# Patient Record
Sex: MALE | Race: WHITE | NOT HISPANIC OR LATINO | Employment: FULL TIME | URBAN - NONMETROPOLITAN AREA
[De-identification: names, ages, dates, MRNs, and addresses within clinical notes are randomized per-mention and may not be internally consistent; named-entity substitution may affect disease eponyms.]

---

## 2019-11-18 ENCOUNTER — APPOINTMENT (OUTPATIENT)
Dept: LAB | Facility: HOSPITAL | Age: 44
End: 2019-11-18

## 2019-11-18 ENCOUNTER — OFFICE VISIT (OUTPATIENT)
Dept: FAMILY MEDICINE CLINIC | Facility: CLINIC | Age: 44
End: 2019-11-18

## 2019-11-18 VITALS
TEMPERATURE: 98.3 F | BODY MASS INDEX: 33.5 KG/M2 | HEIGHT: 70 IN | WEIGHT: 234 LBS | DIASTOLIC BLOOD PRESSURE: 110 MMHG | SYSTOLIC BLOOD PRESSURE: 160 MMHG

## 2019-11-18 DIAGNOSIS — I10 ESSENTIAL HYPERTENSION: Primary | ICD-10-CM

## 2019-11-18 DIAGNOSIS — Z00.00 GENERAL MEDICAL EXAM: ICD-10-CM

## 2019-11-18 DIAGNOSIS — G25.81 RESTLESS LEG SYNDROME: ICD-10-CM

## 2019-11-18 LAB
25(OH)D3 SERPL-MCNC: 29.5 NG/ML (ref 30–100)
ALBUMIN SERPL-MCNC: 4.8 G/DL (ref 3.5–5.2)
ALBUMIN/GLOB SERPL: 1.4 G/DL
ALP SERPL-CCNC: 92 U/L (ref 39–117)
ALT SERPL W P-5'-P-CCNC: 31 U/L (ref 1–41)
ANION GAP SERPL CALCULATED.3IONS-SCNC: 13.3 MMOL/L (ref 5–15)
AST SERPL-CCNC: 38 U/L (ref 1–40)
BASOPHILS # BLD AUTO: 0.06 10*3/MM3 (ref 0–0.2)
BASOPHILS NFR BLD AUTO: 0.6 % (ref 0–1.5)
BILIRUB SERPL-MCNC: 0.6 MG/DL (ref 0.2–1.2)
BUN BLD-MCNC: 11 MG/DL (ref 6–20)
BUN/CREAT SERPL: 15.9 (ref 7–25)
CALCIUM SPEC-SCNC: 9.6 MG/DL (ref 8.6–10.5)
CHLORIDE SERPL-SCNC: 95 MMOL/L (ref 98–107)
CO2 SERPL-SCNC: 27.7 MMOL/L (ref 22–29)
CREAT BLD-MCNC: 0.69 MG/DL (ref 0.76–1.27)
DEPRECATED RDW RBC AUTO: 40.9 FL (ref 37–54)
EOSINOPHIL # BLD AUTO: 0.08 10*3/MM3 (ref 0–0.4)
EOSINOPHIL NFR BLD AUTO: 0.8 % (ref 0.3–6.2)
ERYTHROCYTE [DISTWIDTH] IN BLOOD BY AUTOMATED COUNT: 11.8 % (ref 12.3–15.4)
GFR SERPL CREATININE-BSD FRML MDRD: 124 ML/MIN/1.73
GLOBULIN UR ELPH-MCNC: 3.4 GM/DL
GLUCOSE BLD-MCNC: 223 MG/DL (ref 65–99)
HCT VFR BLD AUTO: 47.4 % (ref 37.5–51)
HGB BLD-MCNC: 16.1 G/DL (ref 13–17.7)
IMM GRANULOCYTES # BLD AUTO: 0.02 10*3/MM3 (ref 0–0.05)
IMM GRANULOCYTES NFR BLD AUTO: 0.2 % (ref 0–0.5)
IRON 24H UR-MRATE: 114 MCG/DL (ref 59–158)
LYMPHOCYTES # BLD AUTO: 3.29 10*3/MM3 (ref 0.7–3.1)
LYMPHOCYTES NFR BLD AUTO: 32.4 % (ref 19.6–45.3)
MAGNESIUM SERPL-MCNC: 2.1 MG/DL (ref 1.6–2.6)
MCH RBC QN AUTO: 31.8 PG (ref 26.6–33)
MCHC RBC AUTO-ENTMCNC: 34 G/DL (ref 31.5–35.7)
MCV RBC AUTO: 93.7 FL (ref 79–97)
MONOCYTES # BLD AUTO: 0.77 10*3/MM3 (ref 0.1–0.9)
MONOCYTES NFR BLD AUTO: 7.6 % (ref 5–12)
NEUTROPHILS # BLD AUTO: 5.93 10*3/MM3 (ref 1.7–7)
NEUTROPHILS NFR BLD AUTO: 58.4 % (ref 42.7–76)
NRBC BLD AUTO-RTO: 0 /100 WBC (ref 0–0.2)
PLATELET # BLD AUTO: 259 10*3/MM3 (ref 140–450)
PMV BLD AUTO: 10.1 FL (ref 6–12)
POTASSIUM BLD-SCNC: 4.5 MMOL/L (ref 3.5–5.2)
PROT SERPL-MCNC: 8.2 G/DL (ref 6–8.5)
RBC # BLD AUTO: 5.06 10*6/MM3 (ref 4.14–5.8)
SODIUM BLD-SCNC: 136 MMOL/L (ref 136–145)
TESTOST SERPL-MCNC: 297 NG/DL (ref 249–836)
TSH SERPL DL<=0.05 MIU/L-ACNC: 2.97 UIU/ML (ref 0.27–4.2)
VIT B12 BLD-MCNC: 562 PG/ML (ref 211–946)
WBC NRBC COR # BLD: 10.15 10*3/MM3 (ref 3.4–10.8)

## 2019-11-18 PROCEDURE — 84403 ASSAY OF TOTAL TESTOSTERONE: CPT | Performed by: NURSE PRACTITIONER

## 2019-11-18 PROCEDURE — 83540 ASSAY OF IRON: CPT | Performed by: NURSE PRACTITIONER

## 2019-11-18 PROCEDURE — 80053 COMPREHEN METABOLIC PANEL: CPT | Performed by: NURSE PRACTITIONER

## 2019-11-18 PROCEDURE — 99204 OFFICE O/P NEW MOD 45 MIN: CPT | Performed by: NURSE PRACTITIONER

## 2019-11-18 PROCEDURE — 84443 ASSAY THYROID STIM HORMONE: CPT | Performed by: NURSE PRACTITIONER

## 2019-11-18 PROCEDURE — 82607 VITAMIN B-12: CPT | Performed by: NURSE PRACTITIONER

## 2019-11-18 PROCEDURE — 82306 VITAMIN D 25 HYDROXY: CPT | Performed by: NURSE PRACTITIONER

## 2019-11-18 PROCEDURE — 83735 ASSAY OF MAGNESIUM: CPT | Performed by: NURSE PRACTITIONER

## 2019-11-18 PROCEDURE — 36415 COLL VENOUS BLD VENIPUNCTURE: CPT | Performed by: NURSE PRACTITIONER

## 2019-11-18 PROCEDURE — 85025 COMPLETE CBC W/AUTO DIFF WBC: CPT | Performed by: NURSE PRACTITIONER

## 2019-11-18 PROCEDURE — 83036 HEMOGLOBIN GLYCOSYLATED A1C: CPT | Performed by: NURSE PRACTITIONER

## 2019-11-18 RX ORDER — ALPRAZOLAM 0.25 MG/1
0.25 TABLET ORAL AS NEEDED
Refills: 0 | COMMUNITY
Start: 2019-08-13 | End: 2019-12-12

## 2019-11-18 RX ORDER — GLIPIZIDE 10 MG/1
10 TABLET ORAL DAILY
COMMUNITY
Start: 2018-08-17 | End: 2020-04-29 | Stop reason: SDUPTHER

## 2019-11-18 RX ORDER — PANTOPRAZOLE SODIUM 20 MG/1
20 TABLET, DELAYED RELEASE ORAL AS NEEDED
COMMUNITY
Start: 2018-02-20 | End: 2020-04-29 | Stop reason: SDUPTHER

## 2019-11-18 RX ORDER — CLONIDINE HYDROCHLORIDE 0.1 MG/1
TABLET ORAL
Qty: 60 TABLET | Refills: 5 | Status: SHIPPED | OUTPATIENT
Start: 2019-11-18 | End: 2020-04-29 | Stop reason: SDUPTHER

## 2019-11-18 RX ORDER — CYCLOBENZAPRINE HCL 10 MG
10 TABLET ORAL 3 TIMES DAILY PRN
Refills: 0 | COMMUNITY
Start: 2019-09-10 | End: 2019-12-12

## 2019-11-18 RX ORDER — PROMETHAZINE HYDROCHLORIDE 25 MG/1
25 TABLET ORAL AS NEEDED
COMMUNITY
Start: 2018-07-26 | End: 2020-12-14 | Stop reason: SDUPTHER

## 2019-11-18 RX ORDER — GABAPENTIN 300 MG/1
300 CAPSULE ORAL 2 TIMES DAILY
Qty: 60 CAPSULE | Refills: 2 | Status: SHIPPED | OUTPATIENT
Start: 2019-11-18 | End: 2020-01-29

## 2019-11-18 RX ORDER — LISINOPRIL 40 MG/1
40 TABLET ORAL DAILY
COMMUNITY
Start: 2019-08-07 | End: 2019-11-18

## 2019-11-18 RX ORDER — FLUTICASONE PROPIONATE 50 MCG
2 SPRAY, SUSPENSION (ML) NASAL DAILY
COMMUNITY
Start: 2019-11-12 | End: 2020-11-12

## 2019-11-18 RX ORDER — METOPROLOL SUCCINATE 25 MG/1
25 TABLET, EXTENDED RELEASE ORAL DAILY
COMMUNITY
Start: 2019-07-16 | End: 2020-04-29 | Stop reason: SDUPTHER

## 2019-11-18 RX ORDER — TIZANIDINE 4 MG/1
4 TABLET ORAL NIGHTLY
Refills: 2 | COMMUNITY
Start: 2019-08-13 | End: 2020-01-24 | Stop reason: SDUPTHER

## 2019-11-18 RX ORDER — OLMESARTAN MEDOXOMIL 40 MG/1
40 TABLET ORAL DAILY
Qty: 30 TABLET | Refills: 11 | Status: SHIPPED | OUTPATIENT
Start: 2019-11-18 | End: 2019-12-12 | Stop reason: ALTCHOICE

## 2019-11-18 NOTE — PROGRESS NOTES
Chief Complaint   Patient presents with   • testosterone levels     est care     Subjective   Noman Mims is a 44 y.o. male.     Hypertension   This is a chronic problem. The current episode started more than 1 year ago. The problem has been resolved since onset. The problem is controlled. Pertinent negatives include no anxiety, blurred vision, chest pain, headaches, malaise/fatigue, neck pain, orthopnea, palpitations, peripheral edema, PND, shortness of breath or sweats. Risk factors for coronary artery disease include male gender and sedentary lifestyle. Past treatments include ACE inhibitors. Current antihypertension treatment includes ACE inhibitors. The current treatment provides significant improvement. Compliance problems include diet.  There is no history of angina, kidney disease, CAD/MI, CVA, heart failure, left ventricular hypertrophy, PVD or retinopathy. There is no history of chronic renal disease, coarctation of the aorta, hyperaldosteronism, hypercortisolism, hyperparathyroidism, a hypertension causing med, pheochromocytoma, renovascular disease, sleep apnea or a thyroid problem.   Lower Extremity Issue   This is a recurrent problem. The current episode started more than 1 month ago. The problem occurs constantly. The problem has been gradually worsening. Associated symptoms include arthralgias, fatigue, joint swelling and myalgias. Pertinent negatives include no abdominal pain, anorexia, change in bowel habit, chest pain, chills, congestion, coughing, diaphoresis, fever, headaches, nausea, neck pain, numbness, rash, sore throat, swollen glands, urinary symptoms, vertigo, visual change, vomiting or weakness. The symptoms are aggravated by walking. Treatments tried: neurontin in the past  The treatment provided mild relief.   Fatigue   This is a chronic problem. The current episode started more than 1 year ago. The problem has been gradually worsening. Associated symptoms include arthralgias,  "fatigue, joint swelling and myalgias. Pertinent negatives include no abdominal pain, anorexia, change in bowel habit, chest pain, chills, congestion, coughing, diaphoresis, fever, headaches, nausea, neck pain, numbness, rash, sore throat, swollen glands, urinary symptoms, vertigo, visual change, vomiting or weakness. He has tried rest for the symptoms. The treatment provided mild relief.        The following portions of the patient's history were reviewed and updated as appropriate: allergies, current medications, past social history and problem list.    Review of Systems   Constitutional: Positive for activity change and fatigue. Negative for chills, diaphoresis, fever and malaise/fatigue.   HENT: Negative.  Negative for congestion and sore throat.    Eyes: Negative.  Negative for blurred vision, photophobia, redness and visual disturbance.   Respiratory: Negative.  Negative for cough, choking, chest tightness, shortness of breath, wheezing and stridor.    Cardiovascular: Negative for chest pain, palpitations, orthopnea, leg swelling and PND.   Gastrointestinal: Negative.  Negative for abdominal pain, anorexia, change in bowel habit, nausea and vomiting.   Endocrine: Negative.  Negative for heat intolerance, polydipsia, polyphagia and polyuria.   Genitourinary: Negative.  Negative for decreased urine volume and scrotal swelling.   Musculoskeletal: Positive for arthralgias, back pain, gait problem, joint swelling and myalgias. Negative for neck pain and neck stiffness.        Lower extremity concerns -rsl worsening    Skin: Negative.  Negative for rash.   Allergic/Immunologic: Negative.    Neurological: Negative for vertigo, weakness, numbness and headaches.   Hematological: Negative.  Negative for adenopathy. Does not bruise/bleed easily.   Psychiatric/Behavioral: Negative.  Negative for agitation and behavioral problems.       Objective   BP (!) 160/110   Temp 98.3 °F (36.8 °C)   Ht 177.8 cm (70\")   Wt 106 kg " (234 lb)   BMI 33.58 kg/m²   Physical Exam   Constitutional: He is oriented to person, place, and time. He appears well-developed and well-nourished. No distress.   HENT:   Head: Normocephalic and atraumatic.   Right Ear: External ear normal.   Left Ear: External ear normal.   Mouth/Throat: Oropharynx is clear and moist. No oropharyngeal exudate.   Eyes: EOM are normal. Pupils are equal, round, and reactive to light. Right eye exhibits no discharge. Left eye exhibits no discharge. No scleral icterus.   Neck: Normal range of motion. Neck supple. No JVD present. No tracheal deviation present. No thyromegaly present.   Cardiovascular: Normal rate, regular rhythm, normal heart sounds and intact distal pulses. Exam reveals no gallop and no friction rub.   No murmur heard.  Pulmonary/Chest: Effort normal and breath sounds normal. No stridor. No respiratory distress. He has no wheezes. He has no rales. He exhibits no tenderness.   Abdominal: Soft. Bowel sounds are normal. He exhibits no distension and no mass. There is no tenderness. There is no rebound and no guarding. No hernia.   Musculoskeletal: Normal range of motion. He exhibits tenderness. He exhibits no edema or deformity.   Lower extremity pain with evaluation    Lymphadenopathy:     He has no cervical adenopathy.   Neurological: He is alert and oriented to person, place, and time. He displays normal reflexes. No cranial nerve deficit or sensory deficit. He exhibits normal muscle tone. Coordination normal.   Skin: Skin is warm. No rash noted. He is not diaphoretic. No erythema. No pallor.   Nursing note and vitals reviewed.      Assessment/Plan   Problem List Items Addressed This Visit        Cardiovascular and Mediastinum    Essential hypertension - Primary    Relevant Medications    metoprolol succinate XL (TOPROL-XL) 25 MG 24 hr tablet    olmesartan (BENICAR) 40 MG tablet    cloNIDine (CATAPRES) 0.1 MG tablet    Other Relevant Orders    CBC & Differential     Comprehensive Metabolic Panel    Hemoglobin A1c    Iron    Lipid Panel    Vitamin D 25 Hydroxy    Vitamin B12    TSH    Magnesium    Testosterone    CBC Auto Differential       Other    General medical exam    Relevant Orders    CBC & Differential    Comprehensive Metabolic Panel    Hemoglobin A1c    Iron    Lipid Panel    Vitamin D 25 Hydroxy    Vitamin B12    TSH    Magnesium    Testosterone    CBC Auto Differential    Restless leg syndrome            New Medications Ordered This Visit   Medications   • olmesartan (BENICAR) 40 MG tablet     Sig: Take 1 tablet by mouth Daily.     Dispense:  30 tablet     Refill:  11   • cloNIDine (CATAPRES) 0.1 MG tablet     Sig: Use if blood pressure is 180/90 or above bid     Dispense:  60 tablet     Refill:  5   • gabapentin (NEURONTIN) 300 MG capsule     Sig: Take 1 capsule by mouth 2 (Two) Times a Day.     Dispense:  60 capsule     Refill:  2       It's not just what you eat, but when you eat  Eat breakfast, and eat smaller meals throughout the day. A healthy breakfast can jumpstart your metabolism, while eating small, healthy meals (rather than the standard three large meals) keeps your energy up.   Avoid eating at night. Try to eat dinner earlier and fast for 14-16 hours until breakfast the next morning. Studies suggest that eating only when you’re most active and giving your digestive system a long break each day may help to regulate weight.     meds as directed  Labs ordered, diet discussed, recheck as directed    Change from lisinopril to benicar-monitor blood pressure closely diet discussed, cut out beer and increase water, add neurontin as directed   For rls -recheck in 3 weeks for blood pressure     Patient understands the risks associated with this controlled medication, including tolerance and addiction.  he also agrees to only obtain this medication from me, and not from a another provider, unless that provider is covering for me in my absence.  he also agrees to be  compliant in dosing, and not self adjust the dose of medication.  A signed controlled substance agreement is on file, and he has received a controlled substance education sheet at this a previous visit.  he has also signed a consent for treatment with a controlled substance as per [facility] policy. SYLVIA was obtained.

## 2019-11-19 ENCOUNTER — TELEPHONE (OUTPATIENT)
Dept: FAMILY MEDICINE CLINIC | Facility: CLINIC | Age: 44
End: 2019-11-19

## 2019-11-19 DIAGNOSIS — R73.9 ELEVATED BLOOD SUGAR: Primary | ICD-10-CM

## 2019-11-19 LAB — HBA1C MFR BLD: 8.9 % (ref 4.8–5.6)

## 2019-11-19 NOTE — TELEPHONE ENCOUNTER
----- Message from LISBETH De La Rosa sent at 11/19/2019  2:42 PM CST -----  Regarding: FW:  Can you have them add an A1c to his labs from yesterday.  ----- Message -----  From: Lab, Background User  Sent: 11/18/2019   7:24 PM  To: LISBETH De La Rosa

## 2019-11-20 DIAGNOSIS — E11.9 TYPE 2 DIABETES MELLITUS TREATED WITHOUT INSULIN (HCC): Primary | ICD-10-CM

## 2019-12-12 ENCOUNTER — TELEPHONE (OUTPATIENT)
Dept: FAMILY MEDICINE CLINIC | Facility: CLINIC | Age: 44
End: 2019-12-12

## 2019-12-12 ENCOUNTER — OFFICE VISIT (OUTPATIENT)
Dept: FAMILY MEDICINE CLINIC | Facility: CLINIC | Age: 44
End: 2019-12-12

## 2019-12-12 VITALS
DIASTOLIC BLOOD PRESSURE: 90 MMHG | OXYGEN SATURATION: 98 % | HEIGHT: 70 IN | WEIGHT: 233 LBS | SYSTOLIC BLOOD PRESSURE: 130 MMHG | BODY MASS INDEX: 33.36 KG/M2 | TEMPERATURE: 100.2 F

## 2019-12-12 DIAGNOSIS — J06.9 ACUTE UPPER RESPIRATORY INFECTION: ICD-10-CM

## 2019-12-12 DIAGNOSIS — I10 ESSENTIAL HYPERTENSION: ICD-10-CM

## 2019-12-12 DIAGNOSIS — R50.9 FEVER, UNSPECIFIED FEVER CAUSE: Primary | ICD-10-CM

## 2019-12-12 LAB
EXPIRATION DATE: NORMAL
FLUAV AG NPH QL: NEGATIVE
FLUBV AG NPH QL: NEGATIVE
INTERNAL CONTROL: NORMAL
Lab: NORMAL

## 2019-12-12 PROCEDURE — 99213 OFFICE O/P EST LOW 20 MIN: CPT | Performed by: NURSE PRACTITIONER

## 2019-12-12 PROCEDURE — 87804 INFLUENZA ASSAY W/OPTIC: CPT | Performed by: NURSE PRACTITIONER

## 2019-12-12 RX ORDER — CLARITHROMYCIN 500 MG/1
500 TABLET, COATED ORAL 2 TIMES DAILY
Qty: 20 TABLET | Refills: 0 | Status: SHIPPED | OUTPATIENT
Start: 2019-12-12 | End: 2019-12-18 | Stop reason: SDUPTHER

## 2019-12-12 RX ORDER — DEXTROMETHORPHAN HYDROBROMIDE AND PROMETHAZINE HYDROCHLORIDE 15; 6.25 MG/5ML; MG/5ML
5 SYRUP ORAL 4 TIMES DAILY PRN
Qty: 180 ML | Refills: 0 | Status: SHIPPED | OUTPATIENT
Start: 2019-12-12 | End: 2019-12-18 | Stop reason: SDUPTHER

## 2019-12-12 RX ORDER — VALSARTAN AND HYDROCHLOROTHIAZIDE 320; 12.5 MG/1; MG/1
TABLET, FILM COATED ORAL
COMMUNITY
Start: 2019-12-02 | End: 2020-04-29 | Stop reason: SDUPTHER

## 2019-12-12 NOTE — TELEPHONE ENCOUNTER
Noman called and said he forgot to talk to Mariaa about a medication, asked if Mariaa would call him, did not want to tell me.  I told him if she did not call back today she would tomorrow.     [phone]

## 2019-12-12 NOTE — PROGRESS NOTES
Chief Complaint   Patient presents with   • Hypertension     follow up    • URI     Subjective   Noman Mims is a 44 y.o. male.     Hypertension   This is a chronic problem. The current episode started more than 1 year ago. The problem has been resolved since onset. The problem is controlled. Pertinent negatives include no anxiety, chest pain, malaise/fatigue, neck pain, orthopnea, palpitations, PND, shortness of breath or sweats. Risk factors for coronary artery disease include male gender and sedentary lifestyle. Past treatments include ACE inhibitors. Current antihypertension treatment includes ACE inhibitors. The current treatment provides significant improvement. Compliance problems include diet.  There is no history of angina, kidney disease, CAD/MI, CVA, heart failure, left ventricular hypertrophy, PVD or retinopathy. There is no history of chronic renal disease, coarctation of the aorta, hyperaldosteronism, hypercortisolism, hyperparathyroidism, a hypertension causing med, pheochromocytoma, renovascular disease or a thyroid problem.   URI    This is a recurrent problem. The current episode started 1 to 4 weeks ago. The problem has been gradually worsening. The maximum temperature recorded prior to his arrival was 100.4 - 100.9 F. The fever has been present for 1 to 2 days. Associated symptoms include congestion, coughing, rhinorrhea, sinus pain, sneezing and a sore throat. Pertinent negatives include no abdominal pain, chest pain, diarrhea, joint pain, joint swelling, nausea, neck pain, plugged ear sensation or wheezing. The treatment provided mild relief.   Fatigue   This is a chronic problem. The current episode started more than 1 year ago. The problem has been gradually improving. Associated symptoms include congestion, coughing, fatigue and a sore throat. Pertinent negatives include no abdominal pain, arthralgias, chest pain, joint swelling, myalgias, nausea, neck pain, numbness or weakness. He  has tried rest for the symptoms. The treatment provided mild relief.        The following portions of the patient's history were reviewed and updated as appropriate: allergies, current medications, past social history and problem list.    Review of Systems   Constitutional: Positive for activity change and fatigue. Negative for appetite change, malaise/fatigue and unexpected weight change.   HENT: Positive for congestion, rhinorrhea, sinus pressure, sinus pain, sneezing and sore throat. Negative for postnasal drip.    Eyes: Negative.  Negative for photophobia, redness and visual disturbance.   Respiratory: Positive for cough. Negative for apnea, choking, chest tightness, shortness of breath, wheezing and stridor.    Cardiovascular: Negative for chest pain, palpitations, orthopnea, leg swelling and PND.   Gastrointestinal: Negative.  Negative for abdominal distention, abdominal pain, blood in stool, constipation, diarrhea and nausea.   Endocrine: Negative.  Negative for heat intolerance, polydipsia, polyphagia and polyuria.   Genitourinary: Negative.  Negative for decreased urine volume, penile pain and scrotal swelling.   Musculoskeletal: Negative for arthralgias, back pain, gait problem, joint pain, joint swelling, myalgias, neck pain and neck stiffness.        Lower extremity concerns -rsl worsening    Skin: Negative.    Allergic/Immunologic: Negative.  Negative for environmental allergies, food allergies and immunocompromised state.   Neurological: Negative for tremors, seizures, syncope, speech difficulty, weakness, light-headedness and numbness.   Hematological: Negative.  Negative for adenopathy. Does not bruise/bleed easily.   Psychiatric/Behavioral: Negative.  Negative for agitation, behavioral problems, confusion, decreased concentration, dysphoric mood, hallucinations, self-injury, sleep disturbance and suicidal ideas. The patient is not nervous/anxious and is not hyperactive.        Objective   /90   " Temp 100.2 °F (37.9 °C) (Tympanic)   Ht 177.8 cm (70\")   Wt 106 kg (233 lb)   SpO2 98%   BMI 33.43 kg/m²   Physical Exam   Constitutional: He is oriented to person, place, and time. He appears well-developed and well-nourished. No distress.   HENT:   Head: Normocephalic and atraumatic.   Right Ear: External ear normal.   Left Ear: External ear normal.   Mouth/Throat: Oropharynx is clear and moist. No oropharyngeal exudate.   Eyes: Pupils are equal, round, and reactive to light. EOM are normal. Right eye exhibits no discharge. Left eye exhibits no discharge. No scleral icterus.   Neck: Normal range of motion. Neck supple. No JVD present. No tracheal deviation present. No thyromegaly present.   Cardiovascular: Normal rate, regular rhythm, normal heart sounds and intact distal pulses. Exam reveals no gallop and no friction rub.   No murmur heard.  Pulmonary/Chest: Effort normal. No stridor. No respiratory distress. He has wheezes. He has no rales. He exhibits no tenderness.   Abdominal: Soft. Bowel sounds are normal. He exhibits no distension and no mass. There is no tenderness. There is no rebound and no guarding. No hernia.   Musculoskeletal: Normal range of motion. He exhibits tenderness. He exhibits no edema or deformity.   Lymphadenopathy:     He has no cervical adenopathy.   Neurological: He is alert and oriented to person, place, and time. He displays normal reflexes. No cranial nerve deficit or sensory deficit. He exhibits normal muscle tone. Coordination normal.   Skin: Skin is warm. No rash noted. He is not diaphoretic. No erythema. No pallor.   Nursing note and vitals reviewed.      Assessment/Plan   Problem List Items Addressed This Visit        Cardiovascular and Mediastinum    Essential hypertension    Relevant Medications    valsartan-hydrochlorothiazide (DIOVAN-HCT) 320-12.5 MG per tablet    Other Relevant Orders    CBC & Differential    Comprehensive Metabolic Panel    Iron    Lipid Panel    " Vitamin D 25 Hydroxy    Vitamin B12    TSH    Magnesium       Respiratory    Acute upper respiratory infection    Relevant Medications    clarithromycin (BIAXIN) 500 MG tablet    Other Relevant Orders    CBC & Differential    Comprehensive Metabolic Panel    Iron    Lipid Panel    Vitamin D 25 Hydroxy    Vitamin B12    TSH    Magnesium       Other    Fever - Primary    Relevant Orders    POC Influenza A / B (Completed)    CBC & Differential    Comprehensive Metabolic Panel    Iron    Lipid Panel    Vitamin D 25 Hydroxy    Vitamin B12    TSH    Magnesium           New Medications Ordered This Visit   Medications   • clarithromycin (BIAXIN) 500 MG tablet     Sig: Take 1 tablet by mouth 2 (Two) Times a Day.     Dispense:  20 tablet     Refill:  0   • promethazine-dextromethorphan (PROMETHAZINE-DM) 6.25-15 MG/5ML syrup     Sig: Take 5 mL by mouth 4 (Four) Times a Day As Needed for Cough.     Dispense:  180 mL     Refill:  0      meds as directed, see back in 2 months for dm and blood pressure recheck, diet discussed, meds reviewed, follow up if worsen -labs before next visit

## 2019-12-18 RX ORDER — DEXTROMETHORPHAN HYDROBROMIDE AND PROMETHAZINE HYDROCHLORIDE 15; 6.25 MG/5ML; MG/5ML
5 SYRUP ORAL 4 TIMES DAILY PRN
Qty: 180 ML | Refills: 0 | Status: SHIPPED | OUTPATIENT
Start: 2019-12-18 | End: 2020-01-29

## 2019-12-18 RX ORDER — CLARITHROMYCIN 500 MG/1
500 TABLET, COATED ORAL 2 TIMES DAILY
Qty: 20 TABLET | Refills: 0 | Status: SHIPPED | OUTPATIENT
Start: 2019-12-18 | End: 2020-01-29

## 2020-01-24 ENCOUNTER — TELEPHONE (OUTPATIENT)
Dept: FAMILY MEDICINE CLINIC | Facility: CLINIC | Age: 45
End: 2020-01-24

## 2020-01-24 RX ORDER — TIZANIDINE 4 MG/1
4 TABLET ORAL NIGHTLY
Qty: 30 TABLET | Refills: 2 | Status: SHIPPED | OUTPATIENT
Start: 2020-01-24 | End: 2020-01-29

## 2020-01-24 NOTE — TELEPHONE ENCOUNTER
Pt called and needs a refill of Tizanidine 4 mg  Sent to [facility]. Phone is [phone].

## 2020-01-29 ENCOUNTER — OFFICE VISIT (OUTPATIENT)
Dept: FAMILY MEDICINE CLINIC | Facility: CLINIC | Age: 45
End: 2020-01-29

## 2020-01-29 VITALS
HEIGHT: 70 IN | WEIGHT: 238 LBS | BODY MASS INDEX: 34.07 KG/M2 | SYSTOLIC BLOOD PRESSURE: 128 MMHG | DIASTOLIC BLOOD PRESSURE: 88 MMHG

## 2020-01-29 DIAGNOSIS — R79.89 LOW TESTOSTERONE IN MALE: Primary | ICD-10-CM

## 2020-01-29 DIAGNOSIS — F41.9 ANXIETY: ICD-10-CM

## 2020-01-29 DIAGNOSIS — G25.81 RESTLESS LEG SYNDROME: ICD-10-CM

## 2020-01-29 PROCEDURE — 99214 OFFICE O/P EST MOD 30 MIN: CPT | Performed by: NURSE PRACTITIONER

## 2020-01-29 RX ORDER — TESTOSTERONE CYPIONATE 200 MG/ML
200 INJECTION, SOLUTION INTRAMUSCULAR
Qty: 2 ML | Refills: 0 | Status: SHIPPED | OUTPATIENT
Start: 2020-01-29 | End: 2020-02-14 | Stop reason: SDUPTHER

## 2020-01-29 RX ORDER — GABAPENTIN 300 MG/1
300 CAPSULE ORAL 3 TIMES DAILY
Qty: 90 CAPSULE | Refills: 2 | Status: SHIPPED | OUTPATIENT
Start: 2020-01-29 | End: 2020-04-29 | Stop reason: SDUPTHER

## 2020-01-29 RX ORDER — CITALOPRAM 10 MG/1
10 TABLET ORAL DAILY
Qty: 30 TABLET | Refills: 11 | Status: SHIPPED | OUTPATIENT
Start: 2020-01-29 | End: 2020-04-29 | Stop reason: SDUPTHER

## 2020-01-29 NOTE — PROGRESS NOTES
Chief Complaint   Patient presents with   • Hypertension     follow up after seeing cardio   • Joint Pain     Subjective   Noman Mims is a 44 y.o. male.     Presents with recheck from cardiac clearance-Dr Wilder gave clearance to start testosterone injections on 01/22/2020 see report for further information     Hypertension   This is a chronic problem. The current episode started more than 1 year ago. The problem has been resolved since onset. The problem is controlled. Pertinent negatives include no anxiety, blurred vision, chest pain, headaches, malaise/fatigue, neck pain, orthopnea, palpitations, peripheral edema, PND, shortness of breath or sweats. Risk factors for coronary artery disease include male gender and sedentary lifestyle. Past treatments include ACE inhibitors. Current antihypertension treatment includes ACE inhibitors. The current treatment provides significant improvement. Compliance problems include diet.  There is no history of angina, kidney disease, CAD/MI, CVA, heart failure, left ventricular hypertrophy, PVD or retinopathy. There is no history of chronic renal disease, coarctation of the aorta, hyperaldosteronism, hypercortisolism, hyperparathyroidism, a hypertension causing med, pheochromocytoma, renovascular disease, sleep apnea or a thyroid problem.   Joint Pain   Associated symptoms include arthralgias, fatigue, joint swelling and myalgias. Pertinent negatives include no abdominal pain, anorexia, change in bowel habit, chest pain, chills, congestion, coughing, diaphoresis, fever, headaches, nausea, neck pain, numbness, rash, sore throat, swollen glands, urinary symptoms, vertigo, visual change, vomiting or weakness.   Lower Extremity Issue   This is a recurrent problem. The current episode started more than 1 month ago. The problem occurs constantly. The problem has been gradually worsening. Associated symptoms include arthralgias, fatigue, joint swelling and myalgias. Pertinent  negatives include no abdominal pain, anorexia, change in bowel habit, chest pain, chills, congestion, coughing, diaphoresis, fever, headaches, nausea, neck pain, numbness, rash, sore throat, swollen glands, urinary symptoms, vertigo, visual change, vomiting or weakness. The symptoms are aggravated by walking. Treatments tried: neurontin in the past  The treatment provided mild relief.   Fatigue   This is a chronic problem. The current episode started more than 1 year ago. The problem has been gradually worsening. Associated symptoms include arthralgias, fatigue, joint swelling and myalgias. Pertinent negatives include no abdominal pain, anorexia, change in bowel habit, chest pain, chills, congestion, coughing, diaphoresis, fever, headaches, nausea, neck pain, numbness, rash, sore throat, swollen glands, urinary symptoms, vertigo, visual change, vomiting or weakness. He has tried rest for the symptoms. The treatment provided mild relief.   Anxiety   Presents for follow-up visit. Symptoms include decreased concentration, excessive worry, irritability and nervous/anxious behavior. Patient reports no chest pain, compulsions, confusion, depressed mood, dizziness, feeling of choking, hyperventilation, impotence, insomnia, malaise, muscle tension, nausea, palpitations, restlessness, shortness of breath or suicidal ideas. Symptoms occur most days. Nighttime awakenings: none.     Compliance with medications is %.        The following portions of the patient's history were reviewed and updated as appropriate: allergies, current medications, past social history and problem list.    Review of Systems   Constitutional: Positive for activity change, fatigue and irritability. Negative for chills, diaphoresis, fever, malaise/fatigue and unexpected weight change.   HENT: Negative.  Negative for congestion, dental problem, ear discharge, ear pain, facial swelling, hearing loss, mouth sores, postnasal drip and sore throat.   "  Eyes: Negative.  Negative for blurred vision, photophobia, pain, redness and visual disturbance.   Respiratory: Negative.  Negative for apnea, cough, choking, chest tightness, shortness of breath, wheezing and stridor.    Cardiovascular: Negative.  Negative for chest pain, palpitations, orthopnea, leg swelling and PND.   Gastrointestinal: Negative.  Negative for abdominal distention, abdominal pain, anal bleeding, anorexia, blood in stool, change in bowel habit, constipation, diarrhea, nausea, rectal pain and vomiting.   Endocrine: Negative.  Negative for heat intolerance, polydipsia, polyphagia and polyuria.   Genitourinary: Negative.  Negative for decreased urine volume, impotence and scrotal swelling.   Musculoskeletal: Positive for arthralgias, back pain, gait problem, joint swelling and myalgias. Negative for neck pain and neck stiffness.        Lower extremity concerns -rsl worsening    Skin: Negative.  Negative for color change and rash.   Allergic/Immunologic: Negative.    Neurological: Negative for dizziness, vertigo, tremors, syncope, weakness, numbness and headaches.   Hematological: Negative.  Negative for adenopathy. Does not bruise/bleed easily.   Psychiatric/Behavioral: Positive for decreased concentration and sleep disturbance. Negative for agitation, behavioral problems, confusion, dysphoric mood, hallucinations, self-injury and suicidal ideas. The patient is nervous/anxious. The patient does not have insomnia and is not hyperactive.        Objective   /88   Ht 177.8 cm (70\")   Wt 108 kg (238 lb)   BMI 34.15 kg/m²   Physical Exam   Constitutional: He is oriented to person, place, and time. He appears well-developed and well-nourished. No distress.   HENT:   Head: Normocephalic and atraumatic.   Right Ear: External ear normal.   Left Ear: External ear normal.   Mouth/Throat: Oropharynx is clear and moist. No oropharyngeal exudate.   Eyes: Pupils are equal, round, and reactive to light. EOM " are normal. Right eye exhibits no discharge. Left eye exhibits no discharge. No scleral icterus.   Neck: Normal range of motion. Neck supple. No JVD present. No tracheal deviation present. No thyromegaly present.   Cardiovascular: Normal rate, regular rhythm, normal heart sounds and intact distal pulses. Exam reveals no gallop and no friction rub.   No murmur heard.  Pulmonary/Chest: Effort normal and breath sounds normal. No stridor. No respiratory distress. He has no wheezes. He has no rales. He exhibits no tenderness.   Abdominal: Soft. Bowel sounds are normal. He exhibits no distension and no mass. There is no tenderness. There is no rebound and no guarding. No hernia.   Musculoskeletal: Normal range of motion. He exhibits tenderness. He exhibits no edema or deformity.   Lower extremity pain with evaluation    Lymphadenopathy:     He has no cervical adenopathy.   Neurological: He is alert and oriented to person, place, and time. He displays normal reflexes. No cranial nerve deficit or sensory deficit. He exhibits normal muscle tone. Coordination normal.   Skin: Skin is warm. No rash noted. He is not diaphoretic. No erythema. No pallor.   Nursing note and vitals reviewed.      Assessment/Plan   Problem List Items Addressed This Visit        Other    Restless leg syndrome    Relevant Medications    gabapentin (NEURONTIN) 300 MG capsule    Other Relevant Orders    Testosterone    Comprehensive Metabolic Panel    Lipid Panel    Low testosterone in male - Primary    Relevant Medications    Testosterone Cypionate (DEPO-TESTOSTERONE) 200 MG/ML injection    gabapentin (NEURONTIN) 300 MG capsule    Other Relevant Orders    Testosterone    Comprehensive Metabolic Panel    Lipid Panel    Anxiety           New Medications Ordered This Visit   Medications   • Testosterone Cypionate (DEPO-TESTOSTERONE) 200 MG/ML injection     Sig: Inject 1 mL into the appropriate muscle as directed by prescriber Every 14 (Fourteen) Days.      Dispense:  2 mL     Refill:  0   • gabapentin (NEURONTIN) 300 MG capsule     Sig: Take 1 capsule by mouth 3 (Three) Times a Day.     Dispense:  90 capsule     Refill:  2   • citalopram (CELEXA) 10 MG tablet     Sig: Take 1 tablet by mouth Daily.     Dispense:  30 tablet     Refill:  11       It's not just what you eat, but when you eat  Eat breakfast, and eat smaller meals throughout the day. A healthy breakfast can jumpstart your metabolism, while eating small, healthy meals (rather than the standard three large meals) keeps your energy up.   Avoid eating at night. Try to eat dinner earlier and fast for 14-16 hours until breakfast the next morning. Studies suggest that eating only when you’re most active and giving your digestive system a long break each day may help to regulate weight.     Patient understands the risks associated with this controlled medication, including tolerance and addiction.  he also agrees to only obtain this medication from me, and not from a another provider, unless that provider is covering for me in my absence.  he also agrees to be compliant in dosing, and not self adjust the dose of medication.  A signed controlled substance agreement is on file, and he has received a controlled substance education sheet at this a previous visit.  he has also signed a consent for treatment with a controlled substance as per [facility] policy. SYLVIA was obtained.      Add celexa, testosterone, cleared by cardio to start taking, monitor blood pressure, increase neurontin as directed tid prn-see back in 3 months with labs before visit-diet discussed, meds reviewed

## 2020-02-05 ENCOUNTER — TELEPHONE (OUTPATIENT)
Dept: FAMILY MEDICINE CLINIC | Facility: CLINIC | Age: 45
End: 2020-02-05

## 2020-02-05 RX ORDER — TIZANIDINE 4 MG/1
4 TABLET ORAL EVERY 8 HOURS PRN
Qty: 90 TABLET | Refills: 5 | Status: SHIPPED | OUTPATIENT
Start: 2020-02-05 | End: 2020-10-21

## 2020-02-11 ENCOUNTER — APPOINTMENT (OUTPATIENT)
Dept: LAB | Facility: HOSPITAL | Age: 45
End: 2020-02-11

## 2020-02-11 LAB
25(OH)D3 SERPL-MCNC: 29 NG/ML (ref 30–100)
ALBUMIN SERPL-MCNC: 4.5 G/DL (ref 3.5–5.2)
ALBUMIN/GLOB SERPL: 1.6 G/DL
ALP SERPL-CCNC: 76 U/L (ref 39–117)
ALT SERPL W P-5'-P-CCNC: 30 U/L (ref 1–41)
ANION GAP SERPL CALCULATED.3IONS-SCNC: 13.7 MMOL/L (ref 5–15)
AST SERPL-CCNC: 37 U/L (ref 1–40)
BASOPHILS # BLD AUTO: 0.05 10*3/MM3 (ref 0–0.2)
BASOPHILS NFR BLD AUTO: 0.7 % (ref 0–1.5)
BILIRUB SERPL-MCNC: 0.4 MG/DL (ref 0.2–1.2)
BUN BLD-MCNC: 7 MG/DL (ref 6–20)
BUN/CREAT SERPL: 8.6 (ref 7–25)
CALCIUM SPEC-SCNC: 9.4 MG/DL (ref 8.6–10.5)
CHLORIDE SERPL-SCNC: 96 MMOL/L (ref 98–107)
CHOLEST SERPL-MCNC: 167 MG/DL (ref 0–200)
CO2 SERPL-SCNC: 26.3 MMOL/L (ref 22–29)
CREAT BLD-MCNC: 0.81 MG/DL (ref 0.76–1.27)
DEPRECATED RDW RBC AUTO: 46 FL (ref 37–54)
EOSINOPHIL # BLD AUTO: 0.25 10*3/MM3 (ref 0–0.4)
EOSINOPHIL NFR BLD AUTO: 3.3 % (ref 0.3–6.2)
ERYTHROCYTE [DISTWIDTH] IN BLOOD BY AUTOMATED COUNT: 13.8 % (ref 12.3–15.4)
GFR SERPL CREATININE-BSD FRML MDRD: 103 ML/MIN/1.73
GLOBULIN UR ELPH-MCNC: 2.8 GM/DL
GLUCOSE BLD-MCNC: 162 MG/DL (ref 65–99)
HBA1C MFR BLD: 8.2 % (ref 4.8–5.6)
HCT VFR BLD AUTO: 41.7 % (ref 37.5–51)
HDLC SERPL-MCNC: 31 MG/DL (ref 40–60)
HGB BLD-MCNC: 14.8 G/DL (ref 13–17.7)
IMM GRANULOCYTES # BLD AUTO: 0.03 10*3/MM3 (ref 0–0.05)
IMM GRANULOCYTES NFR BLD AUTO: 0.4 % (ref 0–0.5)
IRON 24H UR-MRATE: 99 MCG/DL (ref 59–158)
LDLC SERPL CALC-MCNC: 76 MG/DL (ref 0–100)
LDLC/HDLC SERPL: 2.46 {RATIO}
LYMPHOCYTES # BLD AUTO: 2.41 10*3/MM3 (ref 0.7–3.1)
LYMPHOCYTES NFR BLD AUTO: 31.7 % (ref 19.6–45.3)
MAGNESIUM SERPL-MCNC: 2 MG/DL (ref 1.6–2.6)
MCH RBC QN AUTO: 32.6 PG (ref 26.6–33)
MCHC RBC AUTO-ENTMCNC: 35.5 G/DL (ref 31.5–35.7)
MCV RBC AUTO: 91.9 FL (ref 79–97)
MONOCYTES # BLD AUTO: 0.69 10*3/MM3 (ref 0.1–0.9)
MONOCYTES NFR BLD AUTO: 9.1 % (ref 5–12)
NEUTROPHILS # BLD AUTO: 4.17 10*3/MM3 (ref 1.7–7)
NEUTROPHILS NFR BLD AUTO: 54.8 % (ref 42.7–76)
NRBC BLD AUTO-RTO: 0 /100 WBC (ref 0–0.2)
PLATELET # BLD AUTO: 257 10*3/MM3 (ref 140–450)
PMV BLD AUTO: 9.9 FL (ref 6–12)
POTASSIUM BLD-SCNC: 4.2 MMOL/L (ref 3.5–5.2)
PROT SERPL-MCNC: 7.3 G/DL (ref 6–8.5)
RBC # BLD AUTO: 4.54 10*6/MM3 (ref 4.14–5.8)
SODIUM BLD-SCNC: 136 MMOL/L (ref 136–145)
TESTOST SERPL-MCNC: 586 NG/DL (ref 249–836)
TRIGL SERPL-MCNC: 299 MG/DL (ref 0–150)
TSH SERPL DL<=0.05 MIU/L-ACNC: 1.52 UIU/ML (ref 0.27–4.2)
VIT B12 BLD-MCNC: 682 PG/ML (ref 211–946)
VLDLC SERPL-MCNC: 59.8 MG/DL (ref 5–40)
WBC NRBC COR # BLD: 7.6 10*3/MM3 (ref 3.4–10.8)

## 2020-02-11 PROCEDURE — 80053 COMPREHEN METABOLIC PANEL: CPT | Performed by: NURSE PRACTITIONER

## 2020-02-11 PROCEDURE — 83540 ASSAY OF IRON: CPT | Performed by: NURSE PRACTITIONER

## 2020-02-11 PROCEDURE — 83036 HEMOGLOBIN GLYCOSYLATED A1C: CPT | Performed by: NURSE PRACTITIONER

## 2020-02-11 PROCEDURE — 36415 COLL VENOUS BLD VENIPUNCTURE: CPT | Performed by: NURSE PRACTITIONER

## 2020-02-11 PROCEDURE — 82306 VITAMIN D 25 HYDROXY: CPT | Performed by: NURSE PRACTITIONER

## 2020-02-11 PROCEDURE — 84403 ASSAY OF TOTAL TESTOSTERONE: CPT | Performed by: NURSE PRACTITIONER

## 2020-02-11 PROCEDURE — 84443 ASSAY THYROID STIM HORMONE: CPT | Performed by: NURSE PRACTITIONER

## 2020-02-11 PROCEDURE — 83735 ASSAY OF MAGNESIUM: CPT | Performed by: NURSE PRACTITIONER

## 2020-02-11 PROCEDURE — 80061 LIPID PANEL: CPT | Performed by: NURSE PRACTITIONER

## 2020-02-11 PROCEDURE — 82607 VITAMIN B-12: CPT | Performed by: NURSE PRACTITIONER

## 2020-02-11 PROCEDURE — 85025 COMPLETE CBC W/AUTO DIFF WBC: CPT | Performed by: NURSE PRACTITIONER

## 2020-02-13 DIAGNOSIS — R79.89 LOW TESTOSTERONE IN MALE: ICD-10-CM

## 2020-02-14 NOTE — TELEPHONE ENCOUNTER
Per LISBETH Banks a refill has been called to [facility] in [place] for Mr. Mims's Deop-Testosterone 200 mg/ml  #2 ML with No refill.  Pharmacy was ask to refill when due and not early.    Script Change to Phone in and sent to LISBETH Gaspar for her signature

## 2020-02-14 NOTE — TELEPHONE ENCOUNTER
Mariaa    Mr. Noman Mims states he will be needing his Testosterone Cypionate 200 mg/ml  Inject 1 ml every 14 days.       Last OV    01/29/2020    Next Florencia OV    4/29/2020    Last Script Written  01/29/2020  #2 ml with No refill      Last Rigoberto     01/29/2020     Please advise on refill    Thank you

## 2020-02-14 NOTE — TELEPHONE ENCOUNTER
-Per LISBETH Banks, Mr. Mims has been called with recent lab results & recommendations.  Continue current medications and follow-up as planned or sooner if any problems.    Mariaa  Mr. Mims stopped taking the Bydureon after about 4 injections due to it causing small peanut size knots in his stomach at the injection site.  He states they are still there and its been 12 weeks.  He said he is willing to try giving it in his leg and see if it happens there.      ---- Message from LISBETH De La Rosa sent at 2/12/2020  7:55 AM CST -----  Can you let him know a1c is down-continue meds he is taking-testosterone is good-triglycerides are high-needs lipitor 10mg at night-otherwise good-continue diet changes

## 2020-02-17 RX ORDER — TESTOSTERONE CYPIONATE 200 MG/ML
200 INJECTION, SOLUTION INTRAMUSCULAR
Qty: 2 ML | Refills: 0 | OUTPATIENT
Start: 2020-02-17 | End: 2020-04-29 | Stop reason: SDUPTHER

## 2020-04-07 ENCOUNTER — TELEPHONE (OUTPATIENT)
Dept: FAMILY MEDICINE CLINIC | Facility: CLINIC | Age: 45
End: 2020-04-07

## 2020-04-07 NOTE — TELEPHONE ENCOUNTER
Vida said Noman needs Testosterone refill sent in to [facility] in [place].

## 2020-04-29 ENCOUNTER — OFFICE VISIT (OUTPATIENT)
Dept: FAMILY MEDICINE CLINIC | Facility: CLINIC | Age: 45
End: 2020-04-29

## 2020-04-29 VITALS — SYSTOLIC BLOOD PRESSURE: 130 MMHG | DIASTOLIC BLOOD PRESSURE: 88 MMHG

## 2020-04-29 DIAGNOSIS — E11.49 TYPE 2 DIABETES MELLITUS WITH OTHER NEUROLOGIC COMPLICATION, WITHOUT LONG-TERM CURRENT USE OF INSULIN (HCC): ICD-10-CM

## 2020-04-29 DIAGNOSIS — F41.9 ANXIETY: ICD-10-CM

## 2020-04-29 DIAGNOSIS — E11.49 OTHER DIABETIC NEUROLOGICAL COMPLICATION ASSOCIATED WITH TYPE 2 DIABETES MELLITUS (HCC): ICD-10-CM

## 2020-04-29 DIAGNOSIS — G25.81 RESTLESS LEG SYNDROME: ICD-10-CM

## 2020-04-29 DIAGNOSIS — R79.89 LOW TESTOSTERONE IN MALE: ICD-10-CM

## 2020-04-29 DIAGNOSIS — I10 ESSENTIAL HYPERTENSION: Primary | ICD-10-CM

## 2020-04-29 PROBLEM — E11.9 DIABETES MELLITUS: Status: ACTIVE | Noted: 2020-04-29

## 2020-04-29 PROBLEM — E11.40 DIABETIC NEUROPATHY: Status: ACTIVE | Noted: 2020-04-29

## 2020-04-29 PROCEDURE — 99442 PR PHYS/QHP TELEPHONE EVALUATION 11-20 MIN: CPT | Performed by: NURSE PRACTITIONER

## 2020-04-29 RX ORDER — GLIPIZIDE 10 MG/1
10 TABLET ORAL DAILY
Qty: 30 TABLET | Refills: 11 | Status: SHIPPED | OUTPATIENT
Start: 2020-04-29 | End: 2020-08-07

## 2020-04-29 RX ORDER — PANTOPRAZOLE SODIUM 20 MG/1
20 TABLET, DELAYED RELEASE ORAL AS NEEDED
Qty: 30 TABLET | Refills: 11 | Status: SHIPPED | OUTPATIENT
Start: 2020-04-29 | End: 2021-12-06 | Stop reason: SDUPTHER

## 2020-04-29 RX ORDER — CITALOPRAM 10 MG/1
10 TABLET ORAL DAILY
Qty: 30 TABLET | Refills: 11 | Status: SHIPPED | OUTPATIENT
Start: 2020-04-29 | End: 2021-12-06

## 2020-04-29 RX ORDER — VALSARTAN AND HYDROCHLOROTHIAZIDE 320; 12.5 MG/1; MG/1
1 TABLET, FILM COATED ORAL DAILY
Qty: 30 TABLET | Refills: 11 | Status: SHIPPED | OUTPATIENT
Start: 2020-04-29 | End: 2020-12-17 | Stop reason: SDUPTHER

## 2020-04-29 RX ORDER — TESTOSTERONE CYPIONATE 200 MG/ML
200 INJECTION, SOLUTION INTRAMUSCULAR
Qty: 2 ML | Refills: 0 | Status: SHIPPED | OUTPATIENT
Start: 2020-04-29 | End: 2020-05-11 | Stop reason: SDUPTHER

## 2020-04-29 RX ORDER — GABAPENTIN 300 MG/1
300 CAPSULE ORAL 3 TIMES DAILY
Qty: 90 CAPSULE | Refills: 2 | Status: SHIPPED | OUTPATIENT
Start: 2020-04-29 | End: 2020-08-07 | Stop reason: SDUPTHER

## 2020-04-29 RX ORDER — CLONIDINE HYDROCHLORIDE 0.1 MG/1
TABLET ORAL
Qty: 60 TABLET | Refills: 5 | Status: SHIPPED | OUTPATIENT
Start: 2020-04-29

## 2020-04-29 RX ORDER — METOPROLOL SUCCINATE 25 MG/1
25 TABLET, EXTENDED RELEASE ORAL DAILY
Qty: 30 TABLET | Refills: 11 | Status: SHIPPED | OUTPATIENT
Start: 2020-04-29 | End: 2020-12-17 | Stop reason: SDUPTHER

## 2020-04-29 NOTE — PROGRESS NOTES
No chief complaint on file.    Subjective   Noman Mims is a 44 y.o. male.     Presents with needing refills of meds-dm, htn and testosterone-telephone visit-[place]     Hypertension   This is a chronic problem. The current episode started more than 1 year ago. The problem has been resolved since onset. The problem is controlled. Pertinent negatives include no anxiety, blurred vision, chest pain, headaches, malaise/fatigue, neck pain, orthopnea, palpitations, peripheral edema, PND, shortness of breath or sweats. Risk factors for coronary artery disease include male gender and sedentary lifestyle. Past treatments include ACE inhibitors. Current antihypertension treatment includes ACE inhibitors. The current treatment provides significant improvement. Compliance problems include diet.  There is no history of angina, kidney disease, CAD/MI, CVA, heart failure, left ventricular hypertrophy, PVD or retinopathy. There is no history of chronic renal disease, coarctation of the aorta, hyperaldosteronism, hypercortisolism, hyperparathyroidism, a hypertension causing med, pheochromocytoma, renovascular disease, sleep apnea or a thyroid problem.   Joint Pain   This is a chronic problem. The current episode started more than 1 year ago. The problem occurs daily. The problem has been gradually worsening. Associated symptoms include arthralgias, fatigue, joint swelling and myalgias. Pertinent negatives include no abdominal pain, anorexia, change in bowel habit, chest pain, chills, congestion, coughing, diaphoresis, fever, headaches, nausea, neck pain, numbness, rash, sore throat, swollen glands, urinary symptoms, vertigo, visual change, vomiting or weakness. Treatments tried: neurontin helps  The treatment provided mild relief.   Lower Extremity Issue   This is a recurrent problem. The current episode started more than 1 month ago. The problem  occurs constantly. The problem has been gradually worsening. Associated symptoms include arthralgias, fatigue, joint swelling and myalgias. Pertinent negatives include no abdominal pain, anorexia, change in bowel habit, chest pain, chills, congestion, coughing, diaphoresis, fever, headaches, nausea, neck pain, numbness, rash, sore throat, swollen glands, urinary symptoms, vertigo, visual change, vomiting or weakness. The symptoms are aggravated by walking. Treatments tried: neurontin in the past  The treatment provided mild relief.   Fatigue   This is a chronic problem. The current episode started more than 1 year ago. The problem has been gradually improving. Associated symptoms include arthralgias, fatigue, joint swelling and myalgias. Pertinent negatives include no abdominal pain, anorexia, change in bowel habit, chest pain, chills, congestion, coughing, diaphoresis, fever, headaches, nausea, neck pain, numbness, rash, sore throat, swollen glands, urinary symptoms, vertigo, visual change, vomiting or weakness. He has tried rest for the symptoms. The treatment provided mild relief.   Anxiety   Presents for follow-up visit. Symptoms include decreased concentration, excessive worry, irritability and nervous/anxious behavior. Patient reports no chest pain, compulsions, confusion, depressed mood, dizziness, feeling of choking, hyperventilation, impotence, insomnia, malaise, muscle tension, nausea, palpitations, restlessness, shortness of breath or suicidal ideas. Symptoms occur most days. Nighttime awakenings: none.     Compliance with medications is %.   Blood Sugar Problem   This is a chronic problem. The current episode started more than 1 year ago. The problem occurs daily. The problem has been waxing and waning. Associated symptoms include arthralgias, fatigue, joint swelling and myalgias. Pertinent negatives include no abdominal pain, anorexia, change in bowel habit, chest pain, chills, congestion,  coughing, diaphoresis, fever, headaches, nausea, neck pain, numbness, rash, sore throat, swollen glands, urinary symptoms, vertigo, visual change, vomiting or weakness. Treatments tried: glucotrol and bydureon- The treatment provided mild relief.        The following portions of the patient's history were reviewed and updated as appropriate: allergies, current medications, past social history and problem list.    Review of Systems   Constitutional: Positive for activity change, fatigue and irritability. Negative for appetite change, chills, diaphoresis, fever, malaise/fatigue and unexpected weight change.   HENT: Negative.  Negative for congestion, dental problem, drooling, ear discharge, ear pain, facial swelling, hearing loss, mouth sores, nosebleeds, postnasal drip, rhinorrhea, sinus pressure, sinus pain, sneezing, sore throat, tinnitus and trouble swallowing.    Eyes: Negative.  Negative for blurred vision, photophobia, pain, discharge, redness, itching and visual disturbance.   Respiratory: Negative.  Negative for apnea, cough, choking, chest tightness, shortness of breath, wheezing and stridor.    Cardiovascular: Negative.  Negative for chest pain, palpitations, orthopnea, leg swelling and PND.   Gastrointestinal: Negative.  Negative for abdominal distention, abdominal pain, anal bleeding, anorexia, blood in stool, change in bowel habit, constipation, diarrhea, nausea, rectal pain and vomiting.   Endocrine: Negative.  Negative for cold intolerance, heat intolerance, polydipsia, polyphagia and polyuria.   Genitourinary: Negative.  Negative for decreased urine volume, difficulty urinating, dysuria, impotence and scrotal swelling.   Musculoskeletal: Positive for arthralgias, back pain, gait problem, joint swelling and myalgias. Negative for neck pain and neck stiffness.        Lower extremity concerns -rsl worsening    Skin: Negative.  Negative for color change and rash.   Allergic/Immunologic: Negative.   Negative for environmental allergies, food allergies and immunocompromised state.   Neurological: Negative for dizziness, vertigo, tremors, seizures, syncope, facial asymmetry, speech difficulty, weakness, light-headedness, numbness and headaches.   Hematological: Negative.  Negative for adenopathy. Does not bruise/bleed easily.   Psychiatric/Behavioral: Positive for decreased concentration and sleep disturbance. Negative for agitation, behavioral problems, confusion, dysphoric mood, hallucinations, self-injury and suicidal ideas. The patient is nervous/anxious. The patient does not have insomnia and is not hyperactive.        Objective   /88   Physical Exam   Constitutional:   Limited pe due to telemedicine    Nursing note and vitals reviewed.      Assessment/Plan   Problem List Items Addressed This Visit        Cardiovascular and Mediastinum    Essential hypertension - Primary    Relevant Medications    Dulaglutide (Trulicity) 0.75 MG/0.5ML solution pen-injector    Testosterone Cypionate (Depo-Testosterone) 200 MG/ML injection    gabapentin (Neurontin) 300 MG capsule    citalopram (CeleXA) 10 MG tablet    cloNIDine (Catapres) 0.1 MG tablet    glipizide (GLUCOTROL) 10 MG tablet    metoprolol succinate XL (TOPROL-XL) 25 MG 24 hr tablet    pantoprazole (PROTONIX) 20 MG EC tablet    valsartan-hydrochlorothiazide (DIOVAN-HCT) 320-12.5 MG per tablet    Other Relevant Orders    CBC & Differential    Comprehensive Metabolic Panel    Hemoglobin A1c    Iron    Lipid Panel    Vitamin D 25 Hydroxy    Vitamin B12    TSH    Magnesium    Uric Acid    Testosterone       Endocrine    Diabetes mellitus (CMS/HCC)    Relevant Medications    Dulaglutide (Trulicity) 0.75 MG/0.5ML solution pen-injector    Testosterone Cypionate (Depo-Testosterone) 200 MG/ML injection    gabapentin (Neurontin) 300 MG capsule    citalopram (CeleXA) 10 MG tablet    cloNIDine (Catapres) 0.1 MG tablet    glipizide (GLUCOTROL) 10 MG tablet    metoprolol  succinate XL (TOPROL-XL) 25 MG 24 hr tablet    pantoprazole (PROTONIX) 20 MG EC tablet    valsartan-hydrochlorothiazide (DIOVAN-HCT) 320-12.5 MG per tablet    Other Relevant Orders    CBC & Differential    Comprehensive Metabolic Panel    Hemoglobin A1c    Iron    Lipid Panel    Vitamin D 25 Hydroxy    Vitamin B12    TSH    Magnesium    Uric Acid    Testosterone       Nervous and Auditory    Diabetic neuropathy (CMS/HCC)    Relevant Medications    Dulaglutide (Trulicity) 0.75 MG/0.5ML solution pen-injector    Testosterone Cypionate (Depo-Testosterone) 200 MG/ML injection    gabapentin (Neurontin) 300 MG capsule    citalopram (CeleXA) 10 MG tablet    cloNIDine (Catapres) 0.1 MG tablet    glipizide (GLUCOTROL) 10 MG tablet    metoprolol succinate XL (TOPROL-XL) 25 MG 24 hr tablet    pantoprazole (PROTONIX) 20 MG EC tablet    valsartan-hydrochlorothiazide (DIOVAN-HCT) 320-12.5 MG per tablet    Other Relevant Orders    CBC & Differential    Comprehensive Metabolic Panel    Hemoglobin A1c    Iron    Lipid Panel    Vitamin D 25 Hydroxy    Vitamin B12    TSH    Magnesium    Uric Acid    Testosterone       Other    Restless leg syndrome    Relevant Medications    Dulaglutide (Trulicity) 0.75 MG/0.5ML solution pen-injector    Testosterone Cypionate (Depo-Testosterone) 200 MG/ML injection    gabapentin (Neurontin) 300 MG capsule    citalopram (CeleXA) 10 MG tablet    cloNIDine (Catapres) 0.1 MG tablet    glipizide (GLUCOTROL) 10 MG tablet    metoprolol succinate XL (TOPROL-XL) 25 MG 24 hr tablet    pantoprazole (PROTONIX) 20 MG EC tablet    valsartan-hydrochlorothiazide (DIOVAN-HCT) 320-12.5 MG per tablet    Other Relevant Orders    CBC & Differential    Comprehensive Metabolic Panel    Hemoglobin A1c    Iron    Lipid Panel    Vitamin D 25 Hydroxy    Vitamin B12    TSH    Magnesium    Uric Acid    Testosterone    Low testosterone in male    Relevant Medications    Dulaglutide (Trulicity) 0.75 MG/0.5ML solution pen-injector     Testosterone Cypionate (Depo-Testosterone) 200 MG/ML injection    gabapentin (Neurontin) 300 MG capsule    citalopram (CeleXA) 10 MG tablet    cloNIDine (Catapres) 0.1 MG tablet    glipizide (GLUCOTROL) 10 MG tablet    metoprolol succinate XL (TOPROL-XL) 25 MG 24 hr tablet    pantoprazole (PROTONIX) 20 MG EC tablet    valsartan-hydrochlorothiazide (DIOVAN-HCT) 320-12.5 MG per tablet    Other Relevant Orders    CBC & Differential    Comprehensive Metabolic Panel    Hemoglobin A1c    Iron    Lipid Panel    Vitamin D 25 Hydroxy    Vitamin B12    TSH    Magnesium    Uric Acid    Testosterone    Anxiety           New Medications Ordered This Visit   Medications   • Dulaglutide (Trulicity) 0.75 MG/0.5ML solution pen-injector     Sig: Inject 0.75 mg under the skin into the appropriate area as directed 1 (One) Time Per Week.     Dispense:  4 pen     Refill:  11   • Testosterone Cypionate (Depo-Testosterone) 200 MG/ML injection     Sig: Inject 1 mL into the appropriate muscle as directed by prescriber Every 14 (Fourteen) Days.     Dispense:  2 mL     Refill:  0   • gabapentin (Neurontin) 300 MG capsule     Sig: Take 1 capsule by mouth 3 (Three) Times a Day.     Dispense:  90 capsule     Refill:  2   • citalopram (CeleXA) 10 MG tablet     Sig: Take 1 tablet by mouth Daily.     Dispense:  30 tablet     Refill:  11   • cloNIDine (Catapres) 0.1 MG tablet     Sig: Use if blood pressure is 180/90 or above bid     Dispense:  60 tablet     Refill:  5   • glipizide (GLUCOTROL) 10 MG tablet     Sig: Take 1 tablet by mouth Daily.     Dispense:  30 tablet     Refill:  11   • metoprolol succinate XL (TOPROL-XL) 25 MG 24 hr tablet     Sig: Take 1 tablet by mouth Daily.     Dispense:  30 tablet     Refill:  11   • pantoprazole (PROTONIX) 20 MG EC tablet     Sig: Take 1 tablet by mouth As Needed for Heartburn.     Dispense:  30 tablet     Refill:  11   • valsartan-hydrochlorothiazide (DIOVAN-HCT) 320-12.5 MG per tablet     Sig: Take 1  tablet by mouth Daily.     Dispense:  30 tablet     Refill:  11       It's not just what you eat, but when you eat  Eat breakfast, and eat smaller meals throughout the day. A healthy breakfast can jumpstart your metabolism, while eating small, healthy meals (rather than the standard three large meals) keeps your energy up.   Avoid eating at night. Try to eat dinner earlier and fast for 14-16 hours until breakfast the next morning. Studies suggest that eating only when you’re most active and giving your digestive system a long break each day may help to regulate weight.     Patient understands the risks associated with this controlled medication, including tolerance and addiction.  he also agrees to only obtain this medication from me, and not from a another provider, unless that provider is covering for me in my absence.  he also agrees to be compliant in dosing, and not self adjust the dose of medication.  A signed controlled substance agreement is on file, and he has received a controlled substance education sheet at this a previous visit.  he has also signed a consent for treatment with a controlled substance as per [facility] policy. SYLVIA was obtained.      Replace bydureon with trucility-patient reports bydureon making knots on his abdomen and it hurts -last a1c 8.2 needs further treatment--    Labs as directed, meds refilled-see back in 3 months as directed, follow up if worsen     You have chosen to receive care through a telephone visit. Do you consent to use a telephone visit for your medical care today? Yes    approx 14 minutes spent on this visit     Refill meds as directed

## 2020-05-11 DIAGNOSIS — G25.81 RESTLESS LEG SYNDROME: ICD-10-CM

## 2020-05-11 DIAGNOSIS — E11.49 TYPE 2 DIABETES MELLITUS WITH OTHER NEUROLOGIC COMPLICATION, WITHOUT LONG-TERM CURRENT USE OF INSULIN (HCC): ICD-10-CM

## 2020-05-11 DIAGNOSIS — E11.49 OTHER DIABETIC NEUROLOGICAL COMPLICATION ASSOCIATED WITH TYPE 2 DIABETES MELLITUS (HCC): ICD-10-CM

## 2020-05-11 DIAGNOSIS — I10 ESSENTIAL HYPERTENSION: ICD-10-CM

## 2020-05-11 DIAGNOSIS — R79.89 LOW TESTOSTERONE IN MALE: ICD-10-CM

## 2020-05-13 RX ORDER — TESTOSTERONE CYPIONATE 200 MG/ML
200 INJECTION, SOLUTION INTRAMUSCULAR
Qty: 2 ML | Refills: 0 | Status: SHIPPED | OUTPATIENT
Start: 2020-05-13 | End: 2020-07-13 | Stop reason: SDUPTHER

## 2020-07-13 ENCOUNTER — TELEPHONE (OUTPATIENT)
Dept: FAMILY MEDICINE CLINIC | Facility: CLINIC | Age: 45
End: 2020-07-13

## 2020-07-13 DIAGNOSIS — R79.89 LOW TESTOSTERONE IN MALE: ICD-10-CM

## 2020-07-13 DIAGNOSIS — I10 ESSENTIAL HYPERTENSION: ICD-10-CM

## 2020-07-13 DIAGNOSIS — G25.81 RESTLESS LEG SYNDROME: ICD-10-CM

## 2020-07-13 DIAGNOSIS — E11.49 OTHER DIABETIC NEUROLOGICAL COMPLICATION ASSOCIATED WITH TYPE 2 DIABETES MELLITUS (HCC): ICD-10-CM

## 2020-07-13 DIAGNOSIS — E11.49 TYPE 2 DIABETES MELLITUS WITH OTHER NEUROLOGIC COMPLICATION, WITHOUT LONG-TERM CURRENT USE OF INSULIN (HCC): ICD-10-CM

## 2020-07-13 RX ORDER — TESTOSTERONE CYPIONATE 200 MG/ML
200 INJECTION, SOLUTION INTRAMUSCULAR
Qty: 2 ML | Refills: 0 | Status: SHIPPED | OUTPATIENT
Start: 2020-07-13 | End: 2020-08-07 | Stop reason: SDUPTHER

## 2020-08-07 ENCOUNTER — LAB (OUTPATIENT)
Dept: LAB | Facility: HOSPITAL | Age: 45
End: 2020-08-07

## 2020-08-07 ENCOUNTER — OFFICE VISIT (OUTPATIENT)
Dept: FAMILY MEDICINE CLINIC | Facility: CLINIC | Age: 45
End: 2020-08-07

## 2020-08-07 VITALS
SYSTOLIC BLOOD PRESSURE: 128 MMHG | BODY MASS INDEX: 32.07 KG/M2 | TEMPERATURE: 99.1 F | HEIGHT: 70 IN | WEIGHT: 224 LBS | HEART RATE: 86 BPM | OXYGEN SATURATION: 99 % | DIASTOLIC BLOOD PRESSURE: 88 MMHG

## 2020-08-07 DIAGNOSIS — R79.89 LOW TESTOSTERONE IN MALE: ICD-10-CM

## 2020-08-07 DIAGNOSIS — E11.49 OTHER DIABETIC NEUROLOGICAL COMPLICATION ASSOCIATED WITH TYPE 2 DIABETES MELLITUS (HCC): ICD-10-CM

## 2020-08-07 DIAGNOSIS — I10 ESSENTIAL HYPERTENSION: Primary | ICD-10-CM

## 2020-08-07 DIAGNOSIS — E11.49 TYPE 2 DIABETES MELLITUS WITH OTHER NEUROLOGIC COMPLICATION, WITHOUT LONG-TERM CURRENT USE OF INSULIN (HCC): ICD-10-CM

## 2020-08-07 DIAGNOSIS — G25.81 RESTLESS LEG SYNDROME: ICD-10-CM

## 2020-08-07 LAB
25(OH)D3 SERPL-MCNC: 43.4 NG/ML (ref 30–100)
ALBUMIN SERPL-MCNC: 4.7 G/DL (ref 3.5–5.2)
ALBUMIN/GLOB SERPL: 1.6 G/DL
ALP SERPL-CCNC: 61 U/L (ref 39–117)
ALT SERPL W P-5'-P-CCNC: 29 U/L (ref 1–41)
ANION GAP SERPL CALCULATED.3IONS-SCNC: 11.3 MMOL/L (ref 5–15)
AST SERPL-CCNC: 33 U/L (ref 1–40)
BASOPHILS # BLD AUTO: 0.04 10*3/MM3 (ref 0–0.2)
BASOPHILS NFR BLD AUTO: 0.8 % (ref 0–1.5)
BILIRUB SERPL-MCNC: 0.4 MG/DL (ref 0–1.2)
BUN SERPL-MCNC: 19 MG/DL (ref 6–20)
BUN/CREAT SERPL: 10.3 (ref 7–25)
CALCIUM SPEC-SCNC: 10.2 MG/DL (ref 8.6–10.5)
CHLORIDE SERPL-SCNC: 95 MMOL/L (ref 98–107)
CHOLEST SERPL-MCNC: 184 MG/DL (ref 0–200)
CO2 SERPL-SCNC: 25.7 MMOL/L (ref 22–29)
CREAT SERPL-MCNC: 1.84 MG/DL (ref 0.76–1.27)
DEPRECATED RDW RBC AUTO: 48.3 FL (ref 37–54)
EOSINOPHIL # BLD AUTO: 0.08 10*3/MM3 (ref 0–0.4)
EOSINOPHIL NFR BLD AUTO: 1.6 % (ref 0.3–6.2)
ERYTHROCYTE [DISTWIDTH] IN BLOOD BY AUTOMATED COUNT: 13.8 % (ref 12.3–15.4)
GFR SERPL CREATININE-BSD FRML MDRD: 40 ML/MIN/1.73
GLOBULIN UR ELPH-MCNC: 3 GM/DL
GLUCOSE SERPL-MCNC: 148 MG/DL (ref 65–99)
HBA1C MFR BLD: 6.9 % (ref 4.8–5.6)
HCT VFR BLD AUTO: 46 % (ref 37.5–51)
HCV AB SER DONR QL: NORMAL
HDLC SERPL-MCNC: 29 MG/DL (ref 40–60)
HGB BLD-MCNC: 16.2 G/DL (ref 13–17.7)
IMM GRANULOCYTES # BLD AUTO: 0.02 10*3/MM3 (ref 0–0.05)
IMM GRANULOCYTES NFR BLD AUTO: 0.4 % (ref 0–0.5)
IRON 24H UR-MRATE: 85 MCG/DL (ref 59–158)
LDLC SERPL CALC-MCNC: 96 MG/DL (ref 0–100)
LDLC/HDLC SERPL: 3.32 {RATIO}
LYMPHOCYTES # BLD AUTO: 2.15 10*3/MM3 (ref 0.7–3.1)
LYMPHOCYTES NFR BLD AUTO: 44.2 % (ref 19.6–45.3)
MAGNESIUM SERPL-MCNC: 2.2 MG/DL (ref 1.6–2.6)
MCH RBC QN AUTO: 33.7 PG (ref 26.6–33)
MCHC RBC AUTO-ENTMCNC: 35.2 G/DL (ref 31.5–35.7)
MCV RBC AUTO: 95.6 FL (ref 79–97)
MONOCYTES # BLD AUTO: 0.56 10*3/MM3 (ref 0.1–0.9)
MONOCYTES NFR BLD AUTO: 11.5 % (ref 5–12)
NEUTROPHILS NFR BLD AUTO: 2.01 10*3/MM3 (ref 1.7–7)
NEUTROPHILS NFR BLD AUTO: 41.5 % (ref 42.7–76)
NRBC BLD AUTO-RTO: 0 /100 WBC (ref 0–0.2)
PLATELET # BLD AUTO: 253 10*3/MM3 (ref 140–450)
PMV BLD AUTO: 9.6 FL (ref 6–12)
POTASSIUM SERPL-SCNC: 4.6 MMOL/L (ref 3.5–5.2)
PROT SERPL-MCNC: 7.7 G/DL (ref 6–8.5)
RBC # BLD AUTO: 4.81 10*6/MM3 (ref 4.14–5.8)
SODIUM SERPL-SCNC: 132 MMOL/L (ref 136–145)
TESTOST SERPL-MCNC: 310 NG/DL (ref 249–836)
TRIGL SERPL-MCNC: 294 MG/DL (ref 0–150)
TSH SERPL DL<=0.05 MIU/L-ACNC: 0.79 UIU/ML (ref 0.27–4.2)
URATE SERPL-MCNC: 4.1 MG/DL (ref 3.4–7)
VIT B12 BLD-MCNC: 610 PG/ML (ref 211–946)
VLDLC SERPL-MCNC: 58.8 MG/DL (ref 5–40)
WBC # BLD AUTO: 4.86 10*3/MM3 (ref 3.4–10.8)

## 2020-08-07 PROCEDURE — 36415 COLL VENOUS BLD VENIPUNCTURE: CPT | Performed by: NURSE PRACTITIONER

## 2020-08-07 PROCEDURE — 83540 ASSAY OF IRON: CPT | Performed by: NURSE PRACTITIONER

## 2020-08-07 PROCEDURE — 99214 OFFICE O/P EST MOD 30 MIN: CPT | Performed by: NURSE PRACTITIONER

## 2020-08-07 PROCEDURE — 80061 LIPID PANEL: CPT | Performed by: NURSE PRACTITIONER

## 2020-08-07 PROCEDURE — 84443 ASSAY THYROID STIM HORMONE: CPT | Performed by: NURSE PRACTITIONER

## 2020-08-07 PROCEDURE — 80053 COMPREHEN METABOLIC PANEL: CPT | Performed by: NURSE PRACTITIONER

## 2020-08-07 PROCEDURE — 82607 VITAMIN B-12: CPT | Performed by: NURSE PRACTITIONER

## 2020-08-07 PROCEDURE — 83735 ASSAY OF MAGNESIUM: CPT | Performed by: NURSE PRACTITIONER

## 2020-08-07 PROCEDURE — 84403 ASSAY OF TOTAL TESTOSTERONE: CPT | Performed by: NURSE PRACTITIONER

## 2020-08-07 PROCEDURE — 83036 HEMOGLOBIN GLYCOSYLATED A1C: CPT | Performed by: NURSE PRACTITIONER

## 2020-08-07 PROCEDURE — 84550 ASSAY OF BLOOD/URIC ACID: CPT | Performed by: NURSE PRACTITIONER

## 2020-08-07 PROCEDURE — 85025 COMPLETE CBC W/AUTO DIFF WBC: CPT | Performed by: NURSE PRACTITIONER

## 2020-08-07 PROCEDURE — 82306 VITAMIN D 25 HYDROXY: CPT | Performed by: NURSE PRACTITIONER

## 2020-08-07 PROCEDURE — 86803 HEPATITIS C AB TEST: CPT | Performed by: NURSE PRACTITIONER

## 2020-08-07 RX ORDER — TESTOSTERONE CYPIONATE 200 MG/ML
200 INJECTION, SOLUTION INTRAMUSCULAR
Qty: 2 ML | Refills: 0 | Status: SHIPPED | OUTPATIENT
Start: 2020-08-07 | End: 2020-09-17 | Stop reason: SDUPTHER

## 2020-08-07 RX ORDER — GABAPENTIN 300 MG/1
300 CAPSULE ORAL 3 TIMES DAILY
Qty: 90 CAPSULE | Refills: 2 | Status: SHIPPED | OUTPATIENT
Start: 2020-08-07 | End: 2020-12-16 | Stop reason: SDUPTHER

## 2020-08-07 NOTE — PROGRESS NOTES
Chief Complaint   Patient presents with   • testosterone     3 month check up    • Med Refill     gabapentin and testosterone     Subjective   Noman Mims is a 45 y.o. male.     Presents with needing refills of meds-dm, htn and testosterone-      Hypertension   This is a chronic problem. The current episode started more than 1 year ago. The problem has been resolved since onset. The problem is controlled. Pertinent negatives include no anxiety, blurred vision, chest pain, headaches, malaise/fatigue, neck pain, orthopnea, palpitations, peripheral edema, PND, shortness of breath or sweats. Risk factors for coronary artery disease include male gender and sedentary lifestyle. Past treatments include ACE inhibitors. Current antihypertension treatment includes ACE inhibitors. The current treatment provides significant improvement. Compliance problems include diet.  There is no history of angina, kidney disease, CAD/MI, CVA, heart failure, left ventricular hypertrophy, PVD or retinopathy. There is no history of chronic renal disease, coarctation of the aorta, hyperaldosteronism, hypercortisolism, hyperparathyroidism, a hypertension causing med, pheochromocytoma, renovascular disease, sleep apnea or a thyroid problem.   Joint Pain   This is a chronic problem. The current episode started more than 1 year ago. The problem occurs daily. The problem has been gradually worsening. Associated symptoms include arthralgias, fatigue, joint swelling and myalgias. Pertinent negatives include no abdominal pain, anorexia, change in bowel habit, chest pain, chills, congestion, coughing, diaphoresis, fever, headaches, nausea, neck pain, numbness, rash, sore throat, swollen glands, urinary symptoms, vertigo, visual change, vomiting or weakness. Treatments tried: neurontin helps  The treatment provided mild relief.   Lower Extremity Issue   This is a recurrent problem. The current episode started more than 1 month ago. The problem  occurs constantly. The problem has been gradually worsening. Associated symptoms include arthralgias, fatigue, joint swelling and myalgias. Pertinent negatives include no abdominal pain, anorexia, change in bowel habit, chest pain, chills, congestion, coughing, diaphoresis, fever, headaches, nausea, neck pain, numbness, rash, sore throat, swollen glands, urinary symptoms, vertigo, visual change, vomiting or weakness. The symptoms are aggravated by walking. Treatments tried: neurontin in the past  The treatment provided mild relief.   Fatigue   This is a chronic problem. The current episode started more than 1 year ago. The problem has been gradually improving. Associated symptoms include arthralgias, fatigue, joint swelling and myalgias. Pertinent negatives include no abdominal pain, anorexia, change in bowel habit, chest pain, chills, congestion, coughing, diaphoresis, fever, headaches, nausea, neck pain, numbness, rash, sore throat, swollen glands, urinary symptoms, vertigo, visual change, vomiting or weakness. He has tried rest for the symptoms. The treatment provided mild relief.   Anxiety   Presents for follow-up visit. Symptoms include decreased concentration, excessive worry, irritability and nervous/anxious behavior. Patient reports no chest pain, compulsions, confusion, depressed mood, dizziness, feeling of choking, hyperventilation, impotence, insomnia, malaise, muscle tension, nausea, palpitations, restlessness, shortness of breath or suicidal ideas. Symptoms occur most days. Nighttime awakenings: none.     Compliance with medications is %.   Blood Sugar Problem   This is a chronic problem. The current episode started more than 1 year ago. The problem occurs daily. The problem has been waxing and waning. Associated symptoms include arthralgias, fatigue, joint swelling and myalgias. Pertinent negatives include no abdominal pain, anorexia, change in bowel habit, chest pain, chills, congestion,  coughing, diaphoresis, fever, headaches, nausea, neck pain, numbness, rash, sore throat, swollen glands, urinary symptoms, vertigo, visual change, vomiting or weakness. Treatments tried: glucotrol and bydureon- The treatment provided mild relief.        The following portions of the patient's history were reviewed and updated as appropriate: allergies, current medications, past social history and problem list.    Review of Systems   Constitutional: Positive for activity change, fatigue and irritability. Negative for appetite change, chills, diaphoresis, fever, malaise/fatigue and unexpected weight change.   HENT: Negative.  Negative for congestion, dental problem, drooling, ear discharge, ear pain, facial swelling, hearing loss, mouth sores, nosebleeds, postnasal drip, rhinorrhea, sinus pressure, sinus pain, sneezing, sore throat, tinnitus, trouble swallowing and voice change.    Eyes: Negative.  Negative for blurred vision, photophobia, pain, discharge, redness, itching and visual disturbance.   Respiratory: Negative.  Negative for apnea, cough, choking, chest tightness, shortness of breath, wheezing and stridor.    Cardiovascular: Negative.  Negative for chest pain, palpitations, orthopnea, leg swelling and PND.   Gastrointestinal: Negative.  Negative for abdominal distention, abdominal pain, anal bleeding, anorexia, blood in stool, change in bowel habit, constipation, diarrhea, nausea, rectal pain and vomiting.   Endocrine: Negative.  Negative for cold intolerance, heat intolerance, polydipsia, polyphagia and polyuria.   Genitourinary: Negative.  Negative for decreased urine volume, difficulty urinating, dysuria, impotence and scrotal swelling.   Musculoskeletal: Positive for arthralgias, back pain, gait problem, joint swelling and myalgias. Negative for neck pain and neck stiffness.        Lower extremity concerns -rsl worsening    Skin: Negative.  Negative for color change, pallor and rash.  "  Allergic/Immunologic: Negative.  Negative for environmental allergies, food allergies and immunocompromised state.   Neurological: Negative for dizziness, vertigo, tremors, seizures, syncope, facial asymmetry, speech difficulty, weakness, light-headedness, numbness and headaches.   Hematological: Negative.  Negative for adenopathy. Does not bruise/bleed easily.   Psychiatric/Behavioral: Positive for decreased concentration and sleep disturbance. Negative for agitation, behavioral problems, confusion, dysphoric mood, hallucinations, self-injury and suicidal ideas. The patient is nervous/anxious. The patient does not have insomnia and is not hyperactive.        Objective   /88   Pulse 86   Temp 99.1 °F (37.3 °C) (Tympanic)   Ht 177.8 cm (70\")   Wt 102 kg (224 lb)   SpO2 99%   BMI 32.14 kg/m²   Physical Exam   Constitutional: He is oriented to person, place, and time. He appears well-developed and well-nourished. No distress.   HENT:   Head: Normocephalic and atraumatic.   Right Ear: External ear normal.   Left Ear: External ear normal.   Mouth/Throat: Oropharynx is clear and moist. No oropharyngeal exudate.   Eyes: Pupils are equal, round, and reactive to light. EOM are normal. Right eye exhibits no discharge. Left eye exhibits no discharge. No scleral icterus.   Neck: Normal range of motion. Neck supple. No JVD present. No tracheal deviation present. No thyromegaly present.   Cardiovascular: Normal rate, regular rhythm, normal heart sounds and intact distal pulses. Exam reveals no gallop and no friction rub.   No murmur heard.  Pulmonary/Chest: Effort normal and breath sounds normal. No stridor. No respiratory distress. He has no wheezes. He has no rales. He exhibits no tenderness.   Abdominal: Soft. Bowel sounds are normal. He exhibits no distension and no mass. There is no tenderness. There is no rebound and no guarding. No hernia.   Musculoskeletal: Normal range of motion. He exhibits tenderness. He " exhibits no edema or deformity.   Lower extremity pain with evaluation    Lymphadenopathy:     He has no cervical adenopathy.   Neurological: He is alert and oriented to person, place, and time. He displays normal reflexes. No cranial nerve deficit or sensory deficit. He exhibits normal muscle tone. Coordination normal.   Skin: Skin is warm. No rash noted. He is not diaphoretic. No erythema. No pallor.   Nursing note and vitals reviewed.      Assessment/Plan   Problem List Items Addressed This Visit        Cardiovascular and Mediastinum    Essential hypertension - Primary    Relevant Medications    Testosterone Cypionate (Depo-Testosterone) 200 MG/ML injection    gabapentin (Neurontin) 300 MG capsule    Other Relevant Orders    Hepatitis C Antibody       Endocrine    Diabetes mellitus (CMS/HCC)    Relevant Medications    Testosterone Cypionate (Depo-Testosterone) 200 MG/ML injection    gabapentin (Neurontin) 300 MG capsule    Other Relevant Orders    Hepatitis C Antibody    Diabetic neuropathy (CMS/HCC)    Relevant Medications    Testosterone Cypionate (Depo-Testosterone) 200 MG/ML injection    gabapentin (Neurontin) 300 MG capsule    Other Relevant Orders    Hepatitis C Antibody       Other    Restless leg syndrome    Relevant Medications    Testosterone Cypionate (Depo-Testosterone) 200 MG/ML injection    gabapentin (Neurontin) 300 MG capsule    Other Relevant Orders    Hepatitis C Antibody    Low testosterone in male    Relevant Medications    Testosterone Cypionate (Depo-Testosterone) 200 MG/ML injection    gabapentin (Neurontin) 300 MG capsule    Other Relevant Orders    Hepatitis C Antibody           New Medications Ordered This Visit   Medications   • Testosterone Cypionate (Depo-Testosterone) 200 MG/ML injection     Sig: Inject 1 mL into the appropriate muscle as directed by prescriber Every 14 (Fourteen) Days.     Dispense:  2 mL     Refill:  0   • gabapentin (Neurontin) 300 MG capsule     Sig: Take 1  capsule by mouth 3 (Three) Times a Day.     Dispense:  90 capsule     Refill:  2       Patient understands the risks associated with this controlled medication, including tolerance and addiction.  he also agrees to only obtain this medication from me, and not from a another provider, unless that provider is covering for me in my absence.  he also agrees to be compliant in dosing, and not self adjust the dose of medication.  A signed controlled substance agreement is on file, and he has received a controlled substance education sheet at this a previous visit.  he has also signed a consent for treatment with a controlled substance as per [facility] policy. SYLVIA was obtained.    It's not just what you eat, but when you eat  Eat breakfast, and eat smaller meals throughout the day. A healthy breakfast can jumpstart your metabolism, while eating small, healthy meals (rather than the standard three large meals) keeps your energy up.   Avoid eating at night. Try to eat dinner earlier and fast for 14-16 hours until breakfast the next morning. Studies suggest that eating only when you’re most active and giving your digestive system a long break each day may help to regulate weight.     Consider increasing trulicity as directed to 1.5mg and increasing testosterone to weekly, meds as directed, follow up in 3 months

## 2020-09-17 DIAGNOSIS — G25.81 RESTLESS LEG SYNDROME: ICD-10-CM

## 2020-09-17 DIAGNOSIS — E11.49 TYPE 2 DIABETES MELLITUS WITH OTHER NEUROLOGIC COMPLICATION, WITHOUT LONG-TERM CURRENT USE OF INSULIN (HCC): ICD-10-CM

## 2020-09-17 DIAGNOSIS — R79.89 LOW TESTOSTERONE IN MALE: ICD-10-CM

## 2020-09-17 DIAGNOSIS — E11.49 OTHER DIABETIC NEUROLOGICAL COMPLICATION ASSOCIATED WITH TYPE 2 DIABETES MELLITUS (HCC): ICD-10-CM

## 2020-09-17 DIAGNOSIS — I10 ESSENTIAL HYPERTENSION: ICD-10-CM

## 2020-09-17 RX ORDER — TESTOSTERONE CYPIONATE 200 MG/ML
200 INJECTION, SOLUTION INTRAMUSCULAR
Qty: 2 ML | Refills: 0 | Status: SHIPPED | OUTPATIENT
Start: 2020-09-17 | End: 2020-10-26

## 2020-09-17 RX ORDER — TESTOSTERONE CYPIONATE 200 MG/ML
200 INJECTION, SOLUTION INTRAMUSCULAR
Qty: 2 ML | Refills: 0 | Status: CANCELLED | OUTPATIENT
Start: 2020-09-17

## 2020-09-17 NOTE — TELEPHONE ENCOUNTER
Eliseo Leroy is requesting refills on his Testosterone 200 mg/ml  #2ml    Last OV    08/07/2020    Next Florencia OV    11/6/2020    Last Script Written  08/07/2020  #2 ml, NR      Last Rigoberto     08/07/2020    Please advise on refill    Thank you      He should have refills at [facility] in [place] for his Gabapentin 300 mg that was sent 08/07/2020  I have let her know about the Gabapentin

## 2020-09-17 NOTE — TELEPHONE ENCOUNTER
Vida called and said Eliseo needs refills for Gabapentin and Testosterone sent to [facility] in [place].

## 2020-10-21 ENCOUNTER — TELEPHONE (OUTPATIENT)
Dept: FAMILY MEDICINE CLINIC | Facility: CLINIC | Age: 45
End: 2020-10-21

## 2020-10-21 RX ORDER — TIZANIDINE 4 MG/1
TABLET ORAL
Qty: 90 TABLET | Refills: 5 | Status: SHIPPED | OUTPATIENT
Start: 2020-10-21 | End: 2021-07-02 | Stop reason: SDUPTHER

## 2020-10-25 DIAGNOSIS — R79.89 LOW TESTOSTERONE IN MALE: ICD-10-CM

## 2020-10-25 DIAGNOSIS — E11.49 OTHER DIABETIC NEUROLOGICAL COMPLICATION ASSOCIATED WITH TYPE 2 DIABETES MELLITUS (HCC): ICD-10-CM

## 2020-10-25 DIAGNOSIS — G25.81 RESTLESS LEG SYNDROME: ICD-10-CM

## 2020-10-25 DIAGNOSIS — I10 ESSENTIAL HYPERTENSION: ICD-10-CM

## 2020-10-25 DIAGNOSIS — E11.49 TYPE 2 DIABETES MELLITUS WITH OTHER NEUROLOGIC COMPLICATION, WITHOUT LONG-TERM CURRENT USE OF INSULIN (HCC): ICD-10-CM

## 2020-10-26 RX ORDER — TESTOSTERONE CYPIONATE 200 MG/ML
INJECTION, SOLUTION INTRAMUSCULAR
Qty: 2 ML | Refills: 0 | Status: SHIPPED | OUTPATIENT
Start: 2020-10-26 | End: 2021-09-13

## 2020-10-26 NOTE — TELEPHONE ENCOUNTER
Mariaa Mims is requesting a refill on his Testosterone 200mg/ml    Last OV    08/07/2020    Next Florencia OV    11/6/2020    Last Script Written  09/17/2020  #2ml, NR    Last Rigoberto     08/07/2020    Please advise on refill    Thank you

## 2020-12-09 ENCOUNTER — OFFICE VISIT (OUTPATIENT)
Dept: FAMILY MEDICINE CLINIC | Facility: CLINIC | Age: 45
End: 2020-12-09

## 2020-12-09 VITALS
HEART RATE: 97 BPM | OXYGEN SATURATION: 98 % | TEMPERATURE: 98.2 F | SYSTOLIC BLOOD PRESSURE: 138 MMHG | HEIGHT: 70 IN | DIASTOLIC BLOOD PRESSURE: 88 MMHG | WEIGHT: 228.9 LBS | BODY MASS INDEX: 32.77 KG/M2

## 2020-12-09 DIAGNOSIS — I10 ESSENTIAL HYPERTENSION: ICD-10-CM

## 2020-12-09 DIAGNOSIS — R53.83 MALAISE AND FATIGUE: ICD-10-CM

## 2020-12-09 DIAGNOSIS — M79.672 PAIN IN BOTH FEET: Primary | ICD-10-CM

## 2020-12-09 DIAGNOSIS — E11.49 OTHER DIABETIC NEUROLOGICAL COMPLICATION ASSOCIATED WITH TYPE 2 DIABETES MELLITUS (HCC): ICD-10-CM

## 2020-12-09 DIAGNOSIS — M79.671 PAIN IN BOTH FEET: Primary | ICD-10-CM

## 2020-12-09 DIAGNOSIS — R53.81 MALAISE AND FATIGUE: ICD-10-CM

## 2020-12-09 PROCEDURE — 99214 OFFICE O/P EST MOD 30 MIN: CPT | Performed by: NURSE PRACTITIONER

## 2020-12-09 RX ORDER — DULAGLUTIDE 3 MG/.5ML
3 INJECTION, SOLUTION SUBCUTANEOUS WEEKLY
Qty: 4 PEN | Refills: 11 | Status: SHIPPED | OUTPATIENT
Start: 2020-12-09 | End: 2021-05-10 | Stop reason: RX

## 2020-12-09 RX ORDER — PREGABALIN 75 MG/1
75 CAPSULE ORAL 2 TIMES DAILY
Qty: 60 CAPSULE | Refills: 2 | Status: SHIPPED | OUTPATIENT
Start: 2020-12-09 | End: 2021-12-06

## 2020-12-09 NOTE — PROGRESS NOTES
Chief Complaint   Patient presents with   • Foot Pain     bilateral      Subjective   Noman Mims is a 45 y.o. male.     Presents with needing refills of meds-dm, htn and testosterone-  Reports pain worsening in both feel and having a hard time walking due to numbness and tingling in both feet     Hypertension  This is a chronic problem. The current episode started more than 1 year ago. The problem has been resolved since onset. The problem is controlled. Associated symptoms include neck pain. Pertinent negatives include no anxiety, blurred vision, chest pain, headaches, malaise/fatigue, orthopnea, palpitations, peripheral edema, PND, shortness of breath or sweats. Risk factors for coronary artery disease include male gender and sedentary lifestyle. Past treatments include ACE inhibitors. Current antihypertension treatment includes ACE inhibitors. The current treatment provides significant improvement. Compliance problems include diet.  There is no history of angina, kidney disease, CAD/MI, CVA, heart failure, left ventricular hypertrophy, PVD or retinopathy. There is no history of chronic renal disease, coarctation of the aorta, hyperaldosteronism, hypercortisolism, hyperparathyroidism, a hypertension causing med, pheochromocytoma, renovascular disease, sleep apnea or a thyroid problem.   Joint Pain  This is a chronic problem. The current episode started more than 1 year ago. The problem occurs daily. The problem has been gradually worsening. Associated symptoms include arthralgias, fatigue, joint swelling, myalgias, neck pain and numbness. Pertinent negatives include no abdominal pain, anorexia, change in bowel habit, chest pain, chills, congestion, coughing, diaphoresis, fever, headaches, nausea, rash, sore throat, swollen glands, urinary symptoms, vertigo, visual change, vomiting or weakness. Treatments tried: neurontin helps  The treatment provided mild relief.   Lower Extremity Issue  This is a  recurrent problem. The current episode started more than 1 month ago. The problem occurs constantly. The problem has been gradually worsening. Associated symptoms include arthralgias, fatigue, joint swelling, myalgias, neck pain and numbness. Pertinent negatives include no abdominal pain, anorexia, change in bowel habit, chest pain, chills, congestion, coughing, diaphoresis, fever, headaches, nausea, rash, sore throat, swollen glands, urinary symptoms, vertigo, visual change, vomiting or weakness. The symptoms are aggravated by walking. Treatments tried: neurontin in the past  The treatment provided mild relief.   Fatigue  This is a chronic problem. The current episode started more than 1 year ago. The problem has been gradually improving. Associated symptoms include arthralgias, fatigue, joint swelling, myalgias, neck pain and numbness. Pertinent negatives include no abdominal pain, anorexia, change in bowel habit, chest pain, chills, congestion, coughing, diaphoresis, fever, headaches, nausea, rash, sore throat, swollen glands, urinary symptoms, vertigo, visual change, vomiting or weakness. He has tried rest for the symptoms. The treatment provided mild relief.   Anxiety  Presents for follow-up visit. Symptoms include decreased concentration, excessive worry, irritability and nervous/anxious behavior. Patient reports no chest pain, compulsions, confusion, depressed mood, dizziness, feeling of choking, hyperventilation, impotence, insomnia, malaise, muscle tension, nausea, palpitations, restlessness, shortness of breath or suicidal ideas. Symptoms occur most days. Nighttime awakenings: none.     Compliance with medications is %.   Blood Sugar Problem  This is a chronic problem. The current episode started more than 1 year ago. The problem occurs daily. The problem has been waxing and waning. Associated symptoms include arthralgias, fatigue, joint swelling, myalgias, neck pain and numbness. Pertinent negatives  include no abdominal pain, anorexia, change in bowel habit, chest pain, chills, congestion, coughing, diaphoresis, fever, headaches, nausea, rash, sore throat, swollen glands, urinary symptoms, vertigo, visual change, vomiting or weakness. Treatments tried: glucotrol and bydureon- The treatment provided mild relief.        The following portions of the patient's history were reviewed and updated as appropriate: allergies, current medications, past social history and problem list.    Review of Systems   Constitutional: Positive for activity change, fatigue and irritability. Negative for appetite change, chills, diaphoresis, fever, malaise/fatigue and unexpected weight change.   HENT: Negative.  Negative for congestion, dental problem, drooling, ear discharge, ear pain, facial swelling, hearing loss, mouth sores, nosebleeds, postnasal drip, rhinorrhea, sinus pressure, sinus pain, sneezing, sore throat, tinnitus, trouble swallowing and voice change.    Eyes: Negative.  Negative for blurred vision, photophobia, pain, discharge, redness, itching and visual disturbance.   Respiratory: Negative.  Negative for apnea, cough, choking, chest tightness, shortness of breath, wheezing and stridor.    Cardiovascular: Negative.  Negative for chest pain, palpitations, orthopnea, leg swelling and PND.   Gastrointestinal: Negative.  Negative for abdominal distention, abdominal pain, anal bleeding, anorexia, blood in stool, change in bowel habit, constipation, diarrhea, nausea, rectal pain and vomiting.   Endocrine: Negative.  Negative for cold intolerance, heat intolerance, polydipsia, polyphagia and polyuria.   Genitourinary: Negative.  Negative for decreased urine volume, difficulty urinating, dysuria, enuresis, impotence and scrotal swelling.   Musculoskeletal: Positive for arthralgias, back pain, gait problem, joint swelling, myalgias, neck pain and neck stiffness.        Lower extremity concerns -rsl worsening    Skin: Negative.   "Negative for color change, pallor and rash.   Allergic/Immunologic: Negative.  Negative for environmental allergies, food allergies and immunocompromised state.   Neurological: Positive for numbness. Negative for dizziness, vertigo, tremors, seizures, syncope, facial asymmetry, speech difficulty, weakness, light-headedness and headaches.   Hematological: Negative.  Negative for adenopathy. Does not bruise/bleed easily.   Psychiatric/Behavioral: Positive for decreased concentration and sleep disturbance. Negative for agitation, behavioral problems, confusion, dysphoric mood, hallucinations, self-injury and suicidal ideas. The patient is nervous/anxious. The patient does not have insomnia and is not hyperactive.        Objective   /88   Pulse 97   Temp 98.2 °F (36.8 °C)   Ht 177.8 cm (70\")   Wt 104 kg (228 lb 14.4 oz)   SpO2 98%   BMI 32.84 kg/m²   Physical Exam  Vitals signs and nursing note reviewed.   Constitutional:       Appearance: Normal appearance. He is normal weight.   HENT:      Head: Normocephalic and atraumatic.      Right Ear: Tympanic membrane normal.      Nose: Nose normal.      Mouth/Throat:      Mouth: Mucous membranes are dry.   Eyes:      General: No visual field deficit.     Extraocular Movements: Extraocular movements intact.      Pupils: Pupils are equal, round, and reactive to light.   Neck:      Musculoskeletal: Normal range of motion.   Cardiovascular:      Pulses:           Dorsalis pedis pulses are 3+ on the right side and 3+ on the left side.        Posterior tibial pulses are 3+ on the right side and 3+ on the left side.      Heart sounds: Normal heart sounds.   Pulmonary:      Effort: Pulmonary effort is normal.   Abdominal:      General: Abdomen is flat.      Palpations: Abdomen is soft.   Musculoskeletal: Normal range of motion.         General: No swelling, tenderness, deformity or signs of injury.      Right lower leg: No edema.      Left lower leg: No edema.      Right " foot: Normal range of motion. No deformity or bunion.      Left foot: Normal range of motion. No deformity or bunion.   Feet:      Right foot:      Protective Sensation: 6 sites tested. 6 sites sensed.      Skin integrity: Skin integrity normal.      Left foot:      Protective Sensation: 6 sites tested. 6 sites sensed.      Skin integrity: Skin integrity normal.   Skin:     General: Skin is warm.      Capillary Refill: Capillary refill takes less than 2 seconds.   Neurological:      General: No focal deficit present.      Mental Status: He is alert.      Cranial Nerves: No cranial nerve deficit or dysarthria.      Sensory: No sensory deficit.      Motor: No weakness, tremor, atrophy, abnormal muscle tone, seizure activity or pronator drift.      Coordination: Romberg sign negative. Coordination normal. Finger-Nose-Finger Test and Heel to Shin Test normal. Rapid alternating movements normal.      Gait: Gait and tandem walk normal.         Assessment/Plan   Problems Addressed this Visit        Cardiovascular and Mediastinum    Essential hypertension       Endocrine    Diabetic neuropathy (CMS/HCC)    Relevant Medications    Dulaglutide (Trulicity) 3 MG/0.5ML solution pen-injector    pregabalin (Lyrica) 75 MG capsule    Other Relevant Orders    Ambulatory Referral to Neurology    Ambulatory Referral to Pain Management    CBC & Differential    Comprehensive Metabolic Panel    Iron    TSH    Magnesium    Testosterone    Rheumatoid Factor    [place] (IgG / M)      Other Visit Diagnoses     Pain in both feet    -  Primary    Relevant Medications    pregabalin (Lyrica) 75 MG capsule    Other Relevant Orders    Ambulatory Referral to Neurology    CBC & Differential    Comprehensive Metabolic Panel    Iron    TSH    Magnesium    Testosterone    Rheumatoid Factor    [place] (IgG / M)    Malaise and fatigue          Diagnoses       Codes Comments    Pain in both feet    -  Primary ICD-10-CM: M79.671,  M79.672  ICD-9-CM: 729.5     Other diabetic neurological complication associated with type 2 diabetes mellitus (CMS/[facility])     ICD-10-CM: E11.49  ICD-9-CM: 250.60     Essential hypertension     ICD-10-CM: I10  ICD-9-CM: 401.9     Malaise and fatigue     ICD-10-CM: R53.81, R53.83  ICD-9-CM: 780.79            New Medications Ordered This Visit   Medications   • Dulaglutide (Trulicity) 3 MG/0.5ML solution pen-injector     Sig: Inject 3 mg under the skin into the appropriate area as directed 1 (One) Time Per Week.     Dispense:  4 pen     Refill:  11   • pregabalin (Lyrica) 75 MG capsule     Sig: Take 1 capsule by mouth 2 (Two) Times a Day.     Dispense:  60 capsule     Refill:  2     Patient understands the risks associated with this controlled medication, including tolerance and addiction.  he also agrees to only obtain this medication from me, and not from a another provider, unless that provider is covering for me in my absence.  he also agrees to be compliant in dosing, and not self adjust the dose of medication.  A signed controlled substance agreement is on file, and he has received a controlled substance education sheet at this a previous visit.  he has also signed a consent for treatment with a controlled substance as per [facility] policy. SYLVIA was obtained.      Add lyrica bid as directed, increase trulicity to 3mg weekly as directed, diet discussed, meds reviewed, follow up in 3 months -refer to neurology for nerve conduction study and pain management for continued care     Patient has tried and failed on neurontin -continue to take for now will wean off if lyrica is helping.

## 2020-12-10 ENCOUNTER — TELEPHONE (OUTPATIENT)
Dept: FAMILY MEDICINE CLINIC | Facility: CLINIC | Age: 45
End: 2020-12-10

## 2020-12-10 NOTE — TELEPHONE ENCOUNTER
PHARMACY CALLED AND STATED THAT A NEW STRENGTH WAS CALLED IN YESTERDAY. PATIENT TOLD PHARMACY THAT HE WOULD LIKE BOTH DIFFERENT STRENGTHS OF THE MEDICATION.     FIRST STRENGTH WAS 1.5MG  SECOND STRENGTH WAS 3MG    PLEASE CALL PHARMACY FOR CLARIFICATION.    CALLBACK: [phone]    Dulaglutide (Trulicity) 3 MG/0.5ML solution pen-injector

## 2020-12-10 NOTE — TELEPHONE ENCOUNTER
Dr Guerra's office called and said that they received a referral for patient but they also need an order for the nerve conduction study faxed to them at [phone].

## 2020-12-14 ENCOUNTER — TELEPHONE (OUTPATIENT)
Dept: FAMILY MEDICINE CLINIC | Facility: CLINIC | Age: 45
End: 2020-12-14

## 2020-12-14 NOTE — TELEPHONE ENCOUNTER
He can decrease to lower dose-may need a new rx-check on referrels- I put the orders in when I saw him

## 2020-12-14 NOTE — TELEPHONE ENCOUNTER
PATIENT CALLED STATING THAT ANGELA ASKED HIS TO LET THE OFFICE KNOW IF HE HAS ANY REACTION TO HIS TRULICITY. HE IS SICK TO HIS STOMACH.      PATIENT HASNT HEARD BACK FROM THE SPECIALISTS HE WAS REFERRED TO AND WAS WANTING TO KNOW WHY.      PATIENT ALSO NEEDS A REFILL ON HIS promethazine (PHENERGAN) 25 MG tablet    PHARMACY:  [id number] [address] AT [facility] - [phone] [facility] [phone]   [phone]    GOOD CALL BACK:  [phone]

## 2020-12-16 DIAGNOSIS — E11.49 TYPE 2 DIABETES MELLITUS WITH OTHER NEUROLOGIC COMPLICATION, WITHOUT LONG-TERM CURRENT USE OF INSULIN (HCC): ICD-10-CM

## 2020-12-16 DIAGNOSIS — I10 ESSENTIAL HYPERTENSION: ICD-10-CM

## 2020-12-16 DIAGNOSIS — G25.81 RESTLESS LEG SYNDROME: ICD-10-CM

## 2020-12-16 DIAGNOSIS — R79.89 LOW TESTOSTERONE IN MALE: ICD-10-CM

## 2020-12-16 DIAGNOSIS — E11.49 OTHER DIABETIC NEUROLOGICAL COMPLICATION ASSOCIATED WITH TYPE 2 DIABETES MELLITUS (HCC): ICD-10-CM

## 2020-12-16 RX ORDER — PROMETHAZINE HYDROCHLORIDE 25 MG/1
TABLET ORAL
Qty: 20 TABLET | Refills: 1 | Status: SHIPPED | OUTPATIENT
Start: 2020-12-16 | End: 2021-09-13 | Stop reason: SDUPTHER

## 2020-12-16 RX ORDER — GABAPENTIN 300 MG/1
300 CAPSULE ORAL 3 TIMES DAILY
Qty: 90 CAPSULE | Refills: 2 | Status: SHIPPED | OUTPATIENT
Start: 2020-12-16 | End: 2021-12-06 | Stop reason: SDUPTHER

## 2020-12-16 RX ORDER — PROMETHAZINE HYDROCHLORIDE 25 MG/1
TABLET ORAL
Qty: 20 TABLET | Refills: 1 | Status: SHIPPED | OUTPATIENT
Start: 2020-12-16 | End: 2020-12-16 | Stop reason: SDUPTHER

## 2020-12-16 NOTE — TELEPHONE ENCOUNTER
Patient called in saying he could not tolerate the higher dose Trulicity wanting to change back to the lower dose and also did not like the Lyrica he said it made him to off balanced. Wanting to go back to the gabapentin.

## 2020-12-17 DIAGNOSIS — E11.49 TYPE 2 DIABETES MELLITUS WITH OTHER NEUROLOGIC COMPLICATION, WITHOUT LONG-TERM CURRENT USE OF INSULIN (HCC): ICD-10-CM

## 2020-12-17 DIAGNOSIS — E11.49 OTHER DIABETIC NEUROLOGICAL COMPLICATION ASSOCIATED WITH TYPE 2 DIABETES MELLITUS (HCC): ICD-10-CM

## 2020-12-17 DIAGNOSIS — I10 ESSENTIAL HYPERTENSION: ICD-10-CM

## 2020-12-17 DIAGNOSIS — G25.81 RESTLESS LEG SYNDROME: ICD-10-CM

## 2020-12-17 DIAGNOSIS — R79.89 LOW TESTOSTERONE IN MALE: ICD-10-CM

## 2020-12-17 RX ORDER — VALSARTAN AND HYDROCHLOROTHIAZIDE 320; 12.5 MG/1; MG/1
1 TABLET, FILM COATED ORAL DAILY
Qty: 30 TABLET | Refills: 11 | Status: SHIPPED | OUTPATIENT
Start: 2020-12-17 | End: 2021-09-13 | Stop reason: SDUPTHER

## 2020-12-17 RX ORDER — METOPROLOL SUCCINATE 25 MG/1
25 TABLET, EXTENDED RELEASE ORAL DAILY
Qty: 30 TABLET | Refills: 11 | Status: SHIPPED | OUTPATIENT
Start: 2020-12-17 | End: 2021-12-06 | Stop reason: SDUPTHER

## 2021-01-25 RX ORDER — PROMETHAZINE HYDROCHLORIDE 25 MG/1
25 TABLET ORAL EVERY 6 HOURS PRN
Qty: 20 TABLET | Refills: 0 | Status: SHIPPED | OUTPATIENT
Start: 2021-01-25 | End: 2021-09-13 | Stop reason: SDUPTHER

## 2021-05-03 ENCOUNTER — TELEPHONE (OUTPATIENT)
Dept: FAMILY MEDICINE CLINIC | Facility: CLINIC | Age: 46
End: 2021-05-03

## 2021-05-03 RX ORDER — BUSPIRONE HYDROCHLORIDE 10 MG/1
10 TABLET ORAL 3 TIMES DAILY PRN
Qty: 90 TABLET | Refills: 3 | Status: SHIPPED | OUTPATIENT
Start: 2021-05-03 | End: 2021-12-06 | Stop reason: SDUPTHER

## 2021-05-03 NOTE — TELEPHONE ENCOUNTER
Pt's father passed away yesterday and is asking for something to help with his nerves.    Please contact pt  [phone]

## 2021-05-10 NOTE — TELEPHONE ENCOUNTER
Noman called and said Trulicity was cancelled and he needs to talk to someone about his blood sugar medication

## 2021-05-28 RX ORDER — DAPAGLIFLOZIN 10 MG/1
10 TABLET, FILM COATED ORAL DAILY
Qty: 30 TABLET | Refills: 5 | Status: CANCELLED
Start: 2021-05-28

## 2021-07-02 RX ORDER — TIZANIDINE 4 MG/1
4 TABLET ORAL EVERY 8 HOURS PRN
Qty: 90 TABLET | Refills: 5 | Status: SHIPPED | OUTPATIENT
Start: 2021-07-02 | End: 2021-09-13 | Stop reason: SDUPTHER

## 2021-09-13 ENCOUNTER — LAB (OUTPATIENT)
Dept: LAB | Facility: HOSPITAL | Age: 46
End: 2021-09-13

## 2021-09-13 ENCOUNTER — OFFICE VISIT (OUTPATIENT)
Dept: FAMILY MEDICINE CLINIC | Facility: CLINIC | Age: 46
End: 2021-09-13

## 2021-09-13 VITALS
SYSTOLIC BLOOD PRESSURE: 138 MMHG | WEIGHT: 223 LBS | HEIGHT: 70 IN | BODY MASS INDEX: 31.92 KG/M2 | DIASTOLIC BLOOD PRESSURE: 80 MMHG

## 2021-09-13 DIAGNOSIS — R21 RASH: ICD-10-CM

## 2021-09-13 DIAGNOSIS — R53.83 MALAISE AND FATIGUE: Primary | ICD-10-CM

## 2021-09-13 DIAGNOSIS — I10 ESSENTIAL HYPERTENSION: ICD-10-CM

## 2021-09-13 DIAGNOSIS — E11.49 TYPE 2 DIABETES MELLITUS WITH OTHER NEUROLOGIC COMPLICATION, WITHOUT LONG-TERM CURRENT USE OF INSULIN (HCC): ICD-10-CM

## 2021-09-13 DIAGNOSIS — R53.81 MALAISE AND FATIGUE: Primary | ICD-10-CM

## 2021-09-13 DIAGNOSIS — R74.8 ELEVATED LIVER ENZYMES: ICD-10-CM

## 2021-09-13 LAB
25(OH)D3 SERPL-MCNC: 64.7 NG/ML
ALBUMIN SERPL-MCNC: 5 G/DL (ref 3.5–5.2)
ALBUMIN/GLOB SERPL: 1.8 G/DL
ALP SERPL-CCNC: 132 U/L (ref 39–117)
ALT SERPL W P-5'-P-CCNC: 56 U/L (ref 1–41)
ANION GAP SERPL CALCULATED.3IONS-SCNC: 12.5 MMOL/L (ref 5–15)
AST SERPL-CCNC: 82 U/L (ref 1–40)
BASOPHILS # BLD AUTO: 0.06 10*3/MM3 (ref 0–0.2)
BASOPHILS NFR BLD AUTO: 1.1 % (ref 0–1.5)
BILIRUB SERPL-MCNC: 0.5 MG/DL (ref 0–1.2)
BUN SERPL-MCNC: 12 MG/DL (ref 6–20)
BUN/CREAT SERPL: 11.8 (ref 7–25)
CALCIUM SPEC-SCNC: 10 MG/DL (ref 8.6–10.5)
CHLORIDE SERPL-SCNC: 91 MMOL/L (ref 98–107)
CHOLEST SERPL-MCNC: 193 MG/DL (ref 0–200)
CO2 SERPL-SCNC: 28.5 MMOL/L (ref 22–29)
CREAT SERPL-MCNC: 1.02 MG/DL (ref 0.76–1.27)
DEPRECATED RDW RBC AUTO: 43.2 FL (ref 37–54)
EOSINOPHIL # BLD AUTO: 0.26 10*3/MM3 (ref 0–0.4)
EOSINOPHIL NFR BLD AUTO: 4.6 % (ref 0.3–6.2)
ERYTHROCYTE [DISTWIDTH] IN BLOOD BY AUTOMATED COUNT: 12.7 % (ref 12.3–15.4)
GFR SERPL CREATININE-BSD FRML MDRD: 78 ML/MIN/1.73
GLOBULIN UR ELPH-MCNC: 2.8 GM/DL
GLUCOSE SERPL-MCNC: 165 MG/DL (ref 65–99)
HCT VFR BLD AUTO: 37 % (ref 37.5–51)
HDLC SERPL-MCNC: 41 MG/DL (ref 40–60)
HGB BLD-MCNC: 13.1 G/DL (ref 13–17.7)
IMM GRANULOCYTES # BLD AUTO: 0.02 10*3/MM3 (ref 0–0.05)
IMM GRANULOCYTES NFR BLD AUTO: 0.4 % (ref 0–0.5)
IRON 24H UR-MRATE: 124 MCG/DL (ref 59–158)
LDLC SERPL CALC-MCNC: 107 MG/DL (ref 0–100)
LDLC/HDLC SERPL: 2.43 {RATIO}
LYMPHOCYTES # BLD AUTO: 2.05 10*3/MM3 (ref 0.7–3.1)
LYMPHOCYTES NFR BLD AUTO: 36.1 % (ref 19.6–45.3)
MCH RBC QN AUTO: 33.2 PG (ref 26.6–33)
MCHC RBC AUTO-ENTMCNC: 35.4 G/DL (ref 31.5–35.7)
MCV RBC AUTO: 93.7 FL (ref 79–97)
MONOCYTES # BLD AUTO: 0.62 10*3/MM3 (ref 0.1–0.9)
MONOCYTES NFR BLD AUTO: 10.9 % (ref 5–12)
NEUTROPHILS NFR BLD AUTO: 2.67 10*3/MM3 (ref 1.7–7)
NEUTROPHILS NFR BLD AUTO: 46.9 % (ref 42.7–76)
NRBC BLD AUTO-RTO: 0 /100 WBC (ref 0–0.2)
PLATELET # BLD AUTO: 263 10*3/MM3 (ref 140–450)
PMV BLD AUTO: 9.6 FL (ref 6–12)
POTASSIUM SERPL-SCNC: 4.9 MMOL/L (ref 3.5–5.2)
PROT SERPL-MCNC: 7.8 G/DL (ref 6–8.5)
RBC # BLD AUTO: 3.95 10*6/MM3 (ref 4.14–5.8)
SODIUM SERPL-SCNC: 132 MMOL/L (ref 136–145)
TRIGL SERPL-MCNC: 262 MG/DL (ref 0–150)
TSH SERPL DL<=0.05 MIU/L-ACNC: 2.17 UIU/ML (ref 0.27–4.2)
VIT B12 BLD-MCNC: 531 PG/ML (ref 211–946)
VLDLC SERPL-MCNC: 45 MG/DL (ref 5–40)
WBC # BLD AUTO: 5.68 10*3/MM3 (ref 3.4–10.8)

## 2021-09-13 PROCEDURE — 84443 ASSAY THYROID STIM HORMONE: CPT | Performed by: NURSE PRACTITIONER

## 2021-09-13 PROCEDURE — 83036 HEMOGLOBIN GLYCOSYLATED A1C: CPT | Performed by: NURSE PRACTITIONER

## 2021-09-13 PROCEDURE — 80061 LIPID PANEL: CPT | Performed by: NURSE PRACTITIONER

## 2021-09-13 PROCEDURE — 80053 COMPREHEN METABOLIC PANEL: CPT | Performed by: NURSE PRACTITIONER

## 2021-09-13 PROCEDURE — 83540 ASSAY OF IRON: CPT | Performed by: NURSE PRACTITIONER

## 2021-09-13 PROCEDURE — 36415 COLL VENOUS BLD VENIPUNCTURE: CPT | Performed by: NURSE PRACTITIONER

## 2021-09-13 PROCEDURE — 82306 VITAMIN D 25 HYDROXY: CPT | Performed by: NURSE PRACTITIONER

## 2021-09-13 PROCEDURE — 85025 COMPLETE CBC W/AUTO DIFF WBC: CPT | Performed by: NURSE PRACTITIONER

## 2021-09-13 PROCEDURE — 82607 VITAMIN B-12: CPT | Performed by: NURSE PRACTITIONER

## 2021-09-13 PROCEDURE — 99214 OFFICE O/P EST MOD 30 MIN: CPT | Performed by: NURSE PRACTITIONER

## 2021-09-13 RX ORDER — PROMETHAZINE HYDROCHLORIDE 25 MG/1
TABLET ORAL
Qty: 20 TABLET | Refills: 1 | Status: SHIPPED | OUTPATIENT
Start: 2021-09-13 | End: 2021-09-20

## 2021-09-13 RX ORDER — TIZANIDINE 4 MG/1
4 TABLET ORAL EVERY 8 HOURS PRN
Qty: 90 TABLET | Refills: 5 | Status: SHIPPED | OUTPATIENT
Start: 2021-09-13 | End: 2021-12-06 | Stop reason: SDUPTHER

## 2021-09-13 RX ORDER — VALSARTAN AND HYDROCHLOROTHIAZIDE 320; 12.5 MG/1; MG/1
1 TABLET, FILM COATED ORAL DAILY
Qty: 30 TABLET | Refills: 11 | Status: SHIPPED | OUTPATIENT
Start: 2021-09-13 | End: 2021-12-06 | Stop reason: SDUPTHER

## 2021-09-13 NOTE — PROGRESS NOTES
Chief Complaint   Patient presents with   • Hypertension     Subjective   Noman Mims is a 46 y.o. male.           Fatigue  This is a recurrent problem. The current episode started more than 1 year ago. The problem occurs daily. The problem has been waxing and waning. Associated symptoms include arthralgias, diaphoresis, fatigue, joint swelling, myalgias and a rash. Pertinent negatives include no abdominal pain, anorexia, chest pain, chills, congestion, coughing, fever, headaches, nausea, neck pain, numbness, sore throat, swollen glands, urinary symptoms, vertigo, visual change, vomiting or weakness. He has tried rest (testosterone ) for the symptoms.   Hypertension  This is a recurrent problem. The current episode started more than 1 year ago. The problem has been resolved since onset. The problem is controlled. Pertinent negatives include no blurred vision, chest pain, headaches, malaise/fatigue, neck pain, palpitations, peripheral edema, PND, shortness of breath or sweats. Risk factors for coronary artery disease include diabetes mellitus and sedentary lifestyle. Past treatments include beta blockers. Current antihypertension treatment includes angiotensin blockers, beta blockers and lifestyle changes. There is no history of angina, kidney disease, CAD/MI, CVA, heart failure, left ventricular hypertrophy, PVD or retinopathy. There is no history of chronic renal disease, coarctation of the aorta, hyperaldosteronism, hypercortisolism, a hypertension causing med, pheochromocytoma, renovascular disease or sleep apnea.   Diabetes  He presents for his follow-up diabetic visit. He has type 2 diabetes mellitus. His disease course has been stable. Pertinent negatives for hypoglycemia include no confusion, dizziness, headaches, nervousness/anxiousness, pallor, sweats or tremors. Associated symptoms include fatigue. Pertinent negatives for diabetes include no blurred vision, no chest pain, no foot paresthesias, no  foot ulcerations, no polydipsia, no polyphagia, no polyuria, no visual change, no weakness and no weight loss. There are no hypoglycemic complications. There are no diabetic complications. Pertinent negatives for diabetic complications include no CVA, PVD or retinopathy. His weight is stable. He is following a generally unhealthy diet. He has not had a previous visit with a dietitian. There is no change in his home blood glucose trend. He does not see a podiatrist.Eye exam is not current.   Rash  This is a new problem. The problem has been gradually worsening since onset. Location: left leg. Associated symptoms include fatigue. Pertinent negatives include no anorexia, congestion, cough, diarrhea, eye pain, fever, joint pain, nail changes, rhinorrhea, shortness of breath, sore throat or vomiting. The treatment provided mild relief. There is no history of allergies, asthma, eczema or varicella.        The following portions of the patient's history were reviewed and updated as appropriate: allergies, current medications, past social history and problem list.    Review of Systems   Constitutional: Positive for diaphoresis and fatigue. Negative for activity change, appetite change, chills, fever, malaise/fatigue, unexpected weight change and weight loss.   HENT: Negative for congestion, dental problem, drooling, ear discharge, hearing loss, mouth sores, nosebleeds, postnasal drip, rhinorrhea and sore throat.    Eyes: Negative.  Negative for blurred vision, photophobia, pain, discharge, redness, itching and visual disturbance.   Respiratory: Negative.  Negative for apnea, cough, choking, chest tightness and shortness of breath.    Cardiovascular: Negative.  Negative for chest pain, palpitations, leg swelling and PND.   Gastrointestinal: Negative.  Negative for abdominal pain, anorexia, constipation, diarrhea, nausea, rectal pain and vomiting.   Endocrine: Negative.  Negative for cold intolerance, heat intolerance,  "polydipsia, polyphagia and polyuria.   Genitourinary: Negative.    Musculoskeletal: Positive for arthralgias, back pain, gait problem, joint swelling and myalgias. Negative for joint pain, neck pain and neck stiffness.   Skin: Positive for rash. Negative for color change, nail changes, pallor and wound.        Rash to left leg    Allergic/Immunologic: Negative for environmental allergies, food allergies and immunocompromised state.   Neurological: Negative for dizziness, vertigo, tremors, facial asymmetry, weakness, light-headedness, numbness and headaches.   Hematological: Negative.  Negative for adenopathy. Does not bruise/bleed easily.   Psychiatric/Behavioral: Negative.  Negative for agitation, behavioral problems, confusion, decreased concentration, dysphoric mood, hallucinations, self-injury, sleep disturbance and suicidal ideas. The patient is not nervous/anxious and is not hyperactive.        Objective   /80   Ht 177.8 cm (70\")   Wt 101 kg (223 lb)   BMI 32.00 kg/m²   Physical Exam  Vitals and nursing note reviewed.   Constitutional:       General: He is not in acute distress.     Appearance: Normal appearance. He is normal weight. He is not ill-appearing, toxic-appearing or diaphoretic.   HENT:      Head: Normocephalic and atraumatic.      Right Ear: Tympanic membrane normal. There is no impacted cerumen.      Left Ear: There is no impacted cerumen.      Nose: Nose normal.      Mouth/Throat:      Mouth: Mucous membranes are dry.   Eyes:      General: No visual field deficit.     Extraocular Movements: Extraocular movements intact.      Pupils: Pupils are equal, round, and reactive to light.   Neck:      Vascular: No carotid bruit.   Cardiovascular:      Rate and Rhythm: Normal rate and regular rhythm.      Pulses:           Dorsalis pedis pulses are 3+ on the right side and 3+ on the left side.        Posterior tibial pulses are 3+ on the right side and 3+ on the left side.      Heart sounds: " Normal heart sounds. No gallop.    Pulmonary:      Effort: Pulmonary effort is normal. No respiratory distress.      Breath sounds: No stridor. No wheezing or rhonchi.   Abdominal:      General: Abdomen is flat.      Palpations: Abdomen is soft. There is no mass.      Tenderness: There is no abdominal tenderness. There is no right CVA tenderness.      Hernia: No hernia is present.   Musculoskeletal:         General: No swelling, tenderness, deformity or signs of injury. Normal range of motion.      Cervical back: Normal range of motion. No rigidity or tenderness.      Right lower leg: No edema.      Left lower leg: No edema.      Right foot: Normal range of motion. No deformity or bunion.      Left foot: Normal range of motion. No deformity or bunion.   Feet:      Right foot:      Protective Sensation: 6 sites tested. 6 sites sensed.      Skin integrity: Skin integrity normal. No ulcer, blister or skin breakdown.      Left foot:      Protective Sensation: 6 sites tested. 6 sites sensed.      Skin integrity: Skin integrity normal. No ulcer, blister or skin breakdown.      Toenail Condition: Left toenails are normal.   Lymphadenopathy:      Cervical: No cervical adenopathy.   Skin:     General: Skin is warm.      Capillary Refill: Capillary refill takes less than 2 seconds.      Coloration: Skin is not jaundiced or pale.      Findings: Rash present. No bruising, erythema or lesion.      Comments: Diffuse rash to left lower leg from recent casting to left leg    Neurological:      General: No focal deficit present.      Mental Status: He is alert and oriented to person, place, and time.      Cranial Nerves: No cranial nerve deficit or dysarthria.      Sensory: No sensory deficit.      Motor: No weakness, tremor, atrophy, abnormal muscle tone, seizure activity or pronator drift.      Coordination: Romberg sign negative. Coordination normal. Finger-Nose-Finger Test and Heel to Shin Test normal. Rapid alternating  movements normal.      Gait: Gait and tandem walk normal.   Psychiatric:         Mood and Affect: Mood normal.         Behavior: Behavior normal.              Assessment/Plan     Problems Addressed this Visit        Cardiac and Vasculature    Essential hypertension    Relevant Medications    valsartan-hydrochlorothiazide (DIOVAN-HCT) 320-12.5 MG per tablet    Other Relevant Orders    CBC & Differential (Completed)    Comprehensive Metabolic Panel (Completed)    Hemoglobin A1c (Completed)    Iron (Completed)    Lipid Panel (Completed)    Vitamin D 25 Hydroxy (Completed)    Vitamin B12 (Completed)    TSH (Completed)       Endocrine and Metabolic    Diabetes mellitus (CMS/HCC)    Relevant Medications    valsartan-hydrochlorothiazide (DIOVAN-HCT) 320-12.5 MG per tablet    Dulaglutide 1.5 MG/0.5ML solution pen-injector    Other Relevant Orders    CBC & Differential (Completed)    Comprehensive Metabolic Panel (Completed)    Hemoglobin A1c (Completed)    Iron (Completed)    Lipid Panel (Completed)    Vitamin D 25 Hydroxy (Completed)    Vitamin B12 (Completed)    TSH (Completed)      Other Visit Diagnoses     Malaise and fatigue    -  Primary    Relevant Orders    CBC & Differential (Completed)    Comprehensive Metabolic Panel (Completed)    Hemoglobin A1c (Completed)    Iron (Completed)    Lipid Panel (Completed)    Vitamin D 25 Hydroxy (Completed)    Vitamin B12 (Completed)    TSH (Completed)    Rash        Relevant Medications    triamcinolone (KENALOG) 0.1 % ointment    Elevated liver enzymes        Relevant Orders    US Liver      Diagnoses       Codes Comments    Malaise and fatigue    -  Primary ICD-10-CM: R53.81, R53.83  ICD-9-CM: 780.79     Essential hypertension     ICD-10-CM: I10  ICD-9-CM: 401.9     Type 2 diabetes mellitus with other neurologic complication, without long-term current use of insulin (CMS/HCC)     ICD-10-CM: E11.49  ICD-9-CM: 250.60     Rash     ICD-10-CM: R21  ICD-9-CM: 782.1     Elevated liver  enzymes     ICD-10-CM: R74.8  ICD-9-CM: 790.5            New Medications Ordered This Visit   Medications   • tiZANidine (ZANAFLEX) 4 MG tablet     Sig: Take 1 tablet by mouth Every 8 (Eight) Hours As Needed for Muscle Spasms.     Dispense:  90 tablet     Refill:  5   • valsartan-hydrochlorothiazide (DIOVAN-HCT) 320-12.5 MG per tablet     Sig: Take 1 tablet by mouth Daily.     Dispense:  30 tablet     Refill:  11   • promethazine (PHENERGAN) 25 MG tablet     Sig: Take 1 tablet Q 6-8 hours as needed for nausea or vomiting.     Dispense:  20 tablet     Refill:  1   • triamcinolone (KENALOG) 0.1 % ointment     Sig: Apply 1 application topically to the appropriate area as directed 3 (Three) Times a Day.     Dispense:  80 g     Refill:  3   • Dulaglutide 1.5 MG/0.5ML solution pen-injector     Sig: Inject 1.5 mg under the skin into the appropriate area as directed Every 7 (Seven) Days.     Dispense:  4 pen     Refill:  5     Current Outpatient Medications on File Prior to Visit   Medication Sig Dispense Refill   • busPIRone (BUSPAR) 10 MG tablet Take 1 tablet by mouth 3 (Three) Times a Day As Needed (tid prn). 90 tablet 3   • citalopram (CeleXA) 10 MG tablet Take 1 tablet by mouth Daily. (Patient taking differently: Take 10 mg by mouth As Needed.) 30 tablet 11   • cloNIDine (Catapres) 0.1 MG tablet Use if blood pressure is 180/90 or above bid 60 tablet 5   • gabapentin (Neurontin) 300 MG capsule Take 1 capsule by mouth 3 (Three) Times a Day. 90 capsule 2   • metoprolol succinate XL (TOPROL-XL) 25 MG 24 hr tablet Take 1 tablet by mouth Daily. 30 tablet 11   • pantoprazole (PROTONIX) 20 MG EC tablet Take 1 tablet by mouth As Needed for Heartburn. 30 tablet 11   • pregabalin (Lyrica) 75 MG capsule Take 1 capsule by mouth 2 (Two) Times a Day. 60 capsule 2   • [DISCONTINUED] Dulaglutide 1.5 MG/0.5ML solution pen-injector Inject 1.5 mg under the skin into the appropriate area as directed Every 7 (Seven) Days. 4 pen 5     No  current facility-administered medications on file prior to visit.       20 minutes   Follow Up   Return in about 6 months (around 3/13/2022).        It's not just what you eat, but when you eat  Eat breakfast, and eat smaller meals throughout the day. A healthy breakfast can jumpstart your metabolism, while eating small, healthy meals (rather than the standard three large meals) keeps your energy up.   Avoid eating at night. Try to eat dinner earlier and fast for 14-16 hours until breakfast the next morning. Studies suggest that eating only when you’re most active and giving your digestive system a long break each day may help to regulate weight.     Refill meds, labs as directed, meds as directed-follow up if worsen see back in 6 months, needs updated eye exam and suggest covid vaccine     Patient has been off of trulicity -start back as directed, recheck in 3 months, may need a pa on meds-continue other meds as directed, us of liver as directed due to elevated liver enzymes

## 2021-09-14 LAB — HBA1C MFR BLD: 7.79 % (ref 4.8–5.6)

## 2021-09-15 ENCOUNTER — TELEPHONE (OUTPATIENT)
Dept: FAMILY MEDICINE CLINIC | Facility: CLINIC | Age: 46
End: 2021-09-15

## 2021-09-15 NOTE — PROGRESS NOTES
Per LISBETH Leroy, Mr. Mims has been called with recent lab results & recommendations.  Continue current medications and follow-up as planned or sooner if any problems.     He states he has been seen Dr Robledo for his liver problem and has Chorisis of the liver, he states it has been quite a while since he has see him  I told him someone would be calling to set up an appointment for the

## 2021-09-15 NOTE — TELEPHONE ENCOUNTER
Per LISBETH Leroy, Mr. Mims has been called with recent lab results & recommendations.  Continue current medications and follow-up as planned or sooner if any problems.     He states he has been seen Dr Robledo for his liver problem and has Chorisis of the liver, he states it has been quite a while since he has see him  I told him someone would be calling to set up an appointment for the US      ----- Message from LISBETH De La Rosa sent at 9/15/2021  7:47 AM CDT -----  Can you let him know that that a1c is up -added the trulicity back and will need a pa-also needs liver us-enzymes are elevated and needs an us of the liver

## 2021-09-17 ENCOUNTER — TELEPHONE (OUTPATIENT)
Dept: FAMILY MEDICINE CLINIC | Facility: CLINIC | Age: 46
End: 2021-09-17

## 2021-09-20 RX ORDER — PROMETHAZINE HYDROCHLORIDE 25 MG/1
TABLET ORAL
Qty: 20 TABLET | Refills: 1 | Status: SHIPPED | OUTPATIENT
Start: 2021-09-20 | End: 2021-10-19

## 2021-10-19 ENCOUNTER — TELEPHONE (OUTPATIENT)
Dept: FAMILY MEDICINE CLINIC | Facility: CLINIC | Age: 46
End: 2021-10-19

## 2021-10-19 RX ORDER — PROMETHAZINE HYDROCHLORIDE 25 MG/1
TABLET ORAL
Qty: 20 TABLET | Refills: 1 | Status: SHIPPED | OUTPATIENT
Start: 2021-10-19 | End: 2021-11-18

## 2021-10-19 NOTE — TELEPHONE ENCOUNTER
Pt called to request call back from Dr. Rojas's MA regarding a couple of Rx's.  (trulicity & testosterone)    [phone]

## 2021-10-19 NOTE — TELEPHONE ENCOUNTER
Mariaa,     Mr. Mims said he feels his current dose of Trulicity is to strong, he feels he needs to drop back to the 0.75 mg/0.5 ml until his body gets use to it, this is what he was on before his Insurance stopped paying for it. .     He states after he is having heart burn for about 3-4 days following his injection.     He is requesting to go back on his Testosterone 200 mg.  Inject 1 ml every 14 days. This was DC last year as well when his trulicity was dc due to coverage.     He states feeling tired all the time and states he knows he needs to be back on it and maybe even at a higher dose based on the way he feels.     Last Testosterone was drawn 08/07/2020.    He would like these sent to [facility]   He would like to pick them up tomorrow, I did tell him he may need additional lab work for the testosterone and that it would be tomorrow before you would be back in office.

## 2021-10-20 DIAGNOSIS — R79.89 LOW TESTOSTERONE: Primary | ICD-10-CM

## 2021-10-20 DIAGNOSIS — Z12.5 PROSTATE CANCER SCREENING: ICD-10-CM

## 2021-10-20 RX ORDER — DULAGLUTIDE 0.75 MG/.5ML
0.75 INJECTION, SOLUTION SUBCUTANEOUS WEEKLY
Qty: 4 PEN | Refills: 11 | Status: SHIPPED | OUTPATIENT
Start: 2021-10-20 | End: 2021-12-06 | Stop reason: SDUPTHER

## 2021-10-20 NOTE — TELEPHONE ENCOUNTER
Per LISBETH Leroy, Mr. Mims has been called with medication information and the need for lab work, orders are in

## 2021-10-20 NOTE — TELEPHONE ENCOUNTER
I sent In a lower dose of trulicity-I will have to order the testosterone and psa and see where he is and go from there. I will order the labs.

## 2021-11-01 ENCOUNTER — TELEPHONE (OUTPATIENT)
Dept: FAMILY MEDICINE CLINIC | Facility: CLINIC | Age: 46
End: 2021-11-01

## 2021-11-01 DIAGNOSIS — M79.672 CHRONIC PAIN OF BOTH FEET: Primary | ICD-10-CM

## 2021-11-01 DIAGNOSIS — M79.671 CHRONIC PAIN OF BOTH FEET: Primary | ICD-10-CM

## 2021-11-01 DIAGNOSIS — M79.671 PAIN IN BOTH FEET: ICD-10-CM

## 2021-11-01 DIAGNOSIS — G89.29 CHRONIC PAIN OF BOTH FEET: Primary | ICD-10-CM

## 2021-11-01 DIAGNOSIS — M79.672 PAIN IN BOTH FEET: ICD-10-CM

## 2021-11-01 NOTE — TELEPHONE ENCOUNTER
Pt called and said that he can hardly walk and needs a referral to Podiatrist. Pt's number is [phone].

## 2021-11-08 ENCOUNTER — LAB (OUTPATIENT)
Dept: LAB | Facility: HOSPITAL | Age: 46
End: 2021-11-08

## 2021-11-08 ENCOUNTER — TELEPHONE (OUTPATIENT)
Dept: FAMILY MEDICINE CLINIC | Facility: CLINIC | Age: 46
End: 2021-11-08

## 2021-11-08 ENCOUNTER — OFFICE VISIT (OUTPATIENT)
Dept: PODIATRY | Facility: CLINIC | Age: 46
End: 2021-11-08

## 2021-11-08 VITALS
BODY MASS INDEX: 31.92 KG/M2 | HEIGHT: 70 IN | HEART RATE: 92 BPM | DIASTOLIC BLOOD PRESSURE: 110 MMHG | OXYGEN SATURATION: 99 % | SYSTOLIC BLOOD PRESSURE: 162 MMHG | WEIGHT: 223 LBS

## 2021-11-08 DIAGNOSIS — M79.671 BILATERAL FOOT PAIN: Primary | ICD-10-CM

## 2021-11-08 DIAGNOSIS — M20.41 HAMMER TOES OF BOTH FEET: ICD-10-CM

## 2021-11-08 DIAGNOSIS — M20.42 HAMMER TOES OF BOTH FEET: ICD-10-CM

## 2021-11-08 DIAGNOSIS — I10 ESSENTIAL HYPERTENSION: Primary | ICD-10-CM

## 2021-11-08 DIAGNOSIS — M77.42 METATARSALGIA OF BOTH FEET: ICD-10-CM

## 2021-11-08 DIAGNOSIS — F41.9 ANXIETY: ICD-10-CM

## 2021-11-08 DIAGNOSIS — M77.41 METATARSALGIA OF BOTH FEET: ICD-10-CM

## 2021-11-08 DIAGNOSIS — E11.49 OTHER DIABETIC NEUROLOGICAL COMPLICATION ASSOCIATED WITH TYPE 2 DIABETES MELLITUS (HCC): ICD-10-CM

## 2021-11-08 DIAGNOSIS — M79.672 BILATERAL FOOT PAIN: Primary | ICD-10-CM

## 2021-11-08 DIAGNOSIS — S93.325S LISFRANC DISLOCATION, LEFT, SEQUELA: ICD-10-CM

## 2021-11-08 LAB — HBA1C MFR BLD: 6.3 % (ref 4.8–5.6)

## 2021-11-08 PROCEDURE — 85652 RBC SED RATE AUTOMATED: CPT | Performed by: NURSE PRACTITIONER

## 2021-11-08 PROCEDURE — 80053 COMPREHEN METABOLIC PANEL: CPT | Performed by: NURSE PRACTITIONER

## 2021-11-08 PROCEDURE — 80061 LIPID PANEL: CPT | Performed by: NURSE PRACTITIONER

## 2021-11-08 PROCEDURE — 83540 ASSAY OF IRON: CPT | Performed by: NURSE PRACTITIONER

## 2021-11-08 PROCEDURE — 84403 ASSAY OF TOTAL TESTOSTERONE: CPT | Performed by: NURSE PRACTITIONER

## 2021-11-08 PROCEDURE — 85025 COMPLETE CBC W/AUTO DIFF WBC: CPT | Performed by: NURSE PRACTITIONER

## 2021-11-08 PROCEDURE — 83036 HEMOGLOBIN GLYCOSYLATED A1C: CPT | Performed by: NURSE PRACTITIONER

## 2021-11-08 PROCEDURE — 84443 ASSAY THYROID STIM HORMONE: CPT | Performed by: NURSE PRACTITIONER

## 2021-11-08 PROCEDURE — 99203 OFFICE O/P NEW LOW 30 MIN: CPT | Performed by: PODIATRIST

## 2021-11-08 PROCEDURE — 82607 VITAMIN B-12: CPT | Performed by: NURSE PRACTITIONER

## 2021-11-08 PROCEDURE — 86431 RHEUMATOID FACTOR QUANT: CPT | Performed by: NURSE PRACTITIONER

## 2021-11-08 PROCEDURE — 82306 VITAMIN D 25 HYDROXY: CPT | Performed by: NURSE PRACTITIONER

## 2021-11-08 PROCEDURE — 36415 COLL VENOUS BLD VENIPUNCTURE: CPT | Performed by: NURSE PRACTITIONER

## 2021-11-08 RX ORDER — MELOXICAM 15 MG/1
15 TABLET ORAL DAILY
Qty: 90 TABLET | Refills: 1 | Status: SHIPPED | OUTPATIENT
Start: 2021-11-08 | End: 2021-12-06 | Stop reason: SDUPTHER

## 2021-11-08 NOTE — PROGRESS NOTES
Noman Mims  1975  46 y.o. male    Patient presents to clinic today with the complaint of bilateral foot pain.   11/08/2021  Chief Complaint   Patient presents with   • Right Foot - Pain   • Left Foot - Pain           History of Present Illness    Noman Mims is a 46 y.o. male who presents for evaluation of worsening bilateral forefoot pain over the past few months.  Denies any trauma or injuries.  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  States he does replace his work boots about every 6 months.  Denies any other previous treatments.  Describes pain as achy, throbbing at times stabbing.      Past Medical History:   Diagnosis Date   • Callus    • Diabetes mellitus (HCC)    • Hypertension          Past Surgical History:   Procedure Laterality Date   • KNEE MENISCECTOMY     • TOTAL HIP ARTHROPLASTY           Family History   Problem Relation Age of Onset   • Heart disease Father    • Diabetes Father    • Cancer Maternal Grandmother          Social History     Socioeconomic History   • Marital status:    Tobacco Use   • Smoking status: Never Smoker   • Smokeless tobacco: Current User     Types: Chew   Vaping Use   • Vaping Use: Never used   Substance and Sexual Activity   • Alcohol use: Yes   • Drug use: Defer   • Sexual activity: Defer         Current Outpatient Medications   Medication Sig Dispense Refill   • busPIRone (BUSPAR) 10 MG tablet Take 1 tablet by mouth 3 (Three) Times a Day As Needed (tid prn). 90 tablet 3   • cloNIDine (Catapres) 0.1 MG tablet Use if blood pressure is 180/90 or above bid 60 tablet 5   • Dulaglutide (Trulicity) 0.75 MG/0.5ML solution pen-injector Inject 0.75 mg under the skin into the appropriate area as directed 1 (One) Time Per Week. 4 pen 11   • metoprolol succinate XL (TOPROL-XL) 25 MG 24 hr tablet Take 1 tablet by mouth Daily. 30 tablet 11   • promethazine (PHENERGAN) 25 MG tablet TAKE 1 TABLET BY MOUTH EVERY 6 TO 8 HOURS AS NEEDED  "for nausea or vomiting 20 tablet 1   • tiZANidine (ZANAFLEX) 4 MG tablet Take 1 tablet by mouth Every 8 (Eight) Hours As Needed for Muscle Spasms. 90 tablet 5   • valsartan-hydrochlorothiazide (DIOVAN-HCT) 320-12.5 MG per tablet Take 1 tablet by mouth Daily. 30 tablet 11   • citalopram (CeleXA) 10 MG tablet Take 1 tablet by mouth Daily. (Patient taking differently: Take 10 mg by mouth As Needed.) 30 tablet 11   • gabapentin (Neurontin) 300 MG capsule Take 1 capsule by mouth 3 (Three) Times a Day. 90 capsule 2   • meloxicam (MOBIC) 15 MG tablet Take 1 tablet by mouth Daily. Take once daily. 90 tablet 1   • pantoprazole (PROTONIX) 20 MG EC tablet Take 1 tablet by mouth As Needed for Heartburn. 30 tablet 11   • pregabalin (Lyrica) 75 MG capsule Take 1 capsule by mouth 2 (Two) Times a Day. 60 capsule 2   • triamcinolone (KENALOG) 0.1 % ointment Apply 1 application topically to the appropriate area as directed 3 (Three) Times a Day. 80 g 3     No current facility-administered medications for this visit.         OBJECTIVE    BP (!) 162/110   Pulse 92   Ht 177.8 cm (70\")   Wt 101 kg (223 lb)   SpO2 99%   BMI 32.00 kg/m²       Review of Systems   Constitutional: Positive for fatigue.   HENT: Negative.    Eyes: Positive for visual disturbance.   Respiratory: Negative.    Cardiovascular: Negative.    Gastrointestinal: Negative.    Endocrine: Positive for cold intolerance.   Genitourinary: Negative.    Musculoskeletal:        Foot pain  Joint pain   Skin: Negative.    Allergic/Immunologic: Negative.    Neurological: Negative.    Hematological: Negative.    Psychiatric/Behavioral: Negative.          Physical Exam   Constitutional: she appears well-developed and well-nourished.   HEENT: Normocephalic. Atraumatic.  CV: No CP. RRR  Resp: Non-labored respirations.  Psychiatric: she has a normal mood and affect. her behavior is normal.         Lower Extremity Exam:  Vascular: DP/PT pulses palpable 2+.   Mild  forefoot edema, " left  Foot warm  CFT wnl  Neuro: Protective sensation intact, b/l.  DTRs intact  Integument: No open wounds or lesions.  No erythema, scaling  No masses  Web spaces c/d/i  Musculoskeletal: LE muscle strength 5/5.   Gait normal  Ankle ROM full without pain or crepitus.  Mild gastrocnemius equinus  STJ ROM full without pain or crepitus  Positive tenderness with passive dorsiflexion, lateral second third MTPJ's  Positive tenderness on palpation of plantar MTPJs 2 and 3 bilateral  Negative Lachmans stress test, bilateral.  Sharp tenderness with stress test to second third MTPJ bilateral  Mild tenderness palpation of first intermetatarsal space/TMT J on left.  No gross instability or crepitus.              ASSESSMENT AND PLAN    Diagnoses and all orders for this visit:    1. Bilateral foot pain (Primary)  -     XR Foot 3+ View Bilateral    2. Metatarsalgia of both feet    3. Hammer toes of both feet    4. Lisfranc dislocation, left, sequela    Other orders  -     meloxicam (MOBIC) 15 MG tablet; Take 1 tablet by mouth Daily. Take once daily.  Dispense: 90 tablet; Refill: 1      -Comprehensive foot and ankle exam performed  -Radiographs ordered and reviewed  -Educated pt on diagnosis, etiology and treatment of  Metatarsalgia, probable old Lisfranc injury on the left.  -Discussed proper motion control shoe gear.  -Recommend power step [place] plus insoles with metatarsal padding  -Rx Meloxicam 15 mg qday, not to be taken with other NSAIDs  -Recheck 4-6 weeks if symptoms not improved          This document has been electronically signed by Steve Fam DPM on November 8, 2021 11:53 CST       Much of this encounter note is an electronic transcription/translation of spoken language to printed text.   Steve Fam DPM  11/8/2021  11:53 CST

## 2021-11-08 NOTE — TELEPHONE ENCOUNTER
Eliseo is coming over here to do some lab work and said, he wanted to make sure that he could be checked for arthritis?  I made him an apt for 12-6 to follow up with you at his request.

## 2021-11-09 LAB
25(OH)D3 SERPL-MCNC: 51.8 NG/ML (ref 30–100)
ALBUMIN SERPL-MCNC: 4.7 G/DL (ref 3.5–5.2)
ALBUMIN/GLOB SERPL: 1.7 G/DL
ALP SERPL-CCNC: 113 U/L (ref 39–117)
ALT SERPL W P-5'-P-CCNC: 24 U/L (ref 1–41)
ANION GAP SERPL CALCULATED.3IONS-SCNC: 10.7 MMOL/L (ref 5–15)
AST SERPL-CCNC: 30 U/L (ref 1–40)
BASOPHILS # BLD AUTO: 0.06 10*3/MM3 (ref 0–0.2)
BASOPHILS NFR BLD AUTO: 0.8 % (ref 0–1.5)
BILIRUB SERPL-MCNC: 0.4 MG/DL (ref 0–1.2)
BUN SERPL-MCNC: 7 MG/DL (ref 6–20)
BUN/CREAT SERPL: 10.4 (ref 7–25)
CALCIUM SPEC-SCNC: 9.6 MG/DL (ref 8.6–10.5)
CHLORIDE SERPL-SCNC: 99 MMOL/L (ref 98–107)
CHOLEST SERPL-MCNC: 171 MG/DL (ref 0–200)
CHROMATIN AB SERPL-ACNC: <10 IU/ML (ref 0–14)
CO2 SERPL-SCNC: 27.3 MMOL/L (ref 22–29)
CREAT SERPL-MCNC: 0.67 MG/DL (ref 0.76–1.27)
DEPRECATED RDW RBC AUTO: 43.7 FL (ref 37–54)
EOSINOPHIL # BLD AUTO: 0.27 10*3/MM3 (ref 0–0.4)
EOSINOPHIL NFR BLD AUTO: 3.7 % (ref 0.3–6.2)
ERYTHROCYTE [DISTWIDTH] IN BLOOD BY AUTOMATED COUNT: 12.6 % (ref 12.3–15.4)
ERYTHROCYTE [SEDIMENTATION RATE] IN BLOOD: 6 MM/HR (ref 0–15)
GFR SERPL CREATININE-BSD FRML MDRD: 127 ML/MIN/1.73
GLOBULIN UR ELPH-MCNC: 2.8 GM/DL
GLUCOSE SERPL-MCNC: 113 MG/DL (ref 65–99)
HCT VFR BLD AUTO: 39.1 % (ref 37.5–51)
HDLC SERPL-MCNC: 34 MG/DL (ref 40–60)
HGB BLD-MCNC: 13.7 G/DL (ref 13–17.7)
IMM GRANULOCYTES # BLD AUTO: 0.02 10*3/MM3 (ref 0–0.05)
IMM GRANULOCYTES NFR BLD AUTO: 0.3 % (ref 0–0.5)
IRON 24H UR-MRATE: 123 MCG/DL (ref 59–158)
LDLC SERPL CALC-MCNC: 102 MG/DL (ref 0–100)
LDLC/HDLC SERPL: 2.85 {RATIO}
LYMPHOCYTES # BLD AUTO: 2.74 10*3/MM3 (ref 0.7–3.1)
LYMPHOCYTES NFR BLD AUTO: 37.6 % (ref 19.6–45.3)
MCH RBC QN AUTO: 33.2 PG (ref 26.6–33)
MCHC RBC AUTO-ENTMCNC: 35 G/DL (ref 31.5–35.7)
MCV RBC AUTO: 94.7 FL (ref 79–97)
MONOCYTES # BLD AUTO: 0.59 10*3/MM3 (ref 0.1–0.9)
MONOCYTES NFR BLD AUTO: 8.1 % (ref 5–12)
NEUTROPHILS NFR BLD AUTO: 3.6 10*3/MM3 (ref 1.7–7)
NEUTROPHILS NFR BLD AUTO: 49.5 % (ref 42.7–76)
NRBC BLD AUTO-RTO: 0 /100 WBC (ref 0–0.2)
PLATELET # BLD AUTO: 254 10*3/MM3 (ref 140–450)
PMV BLD AUTO: 10.1 FL (ref 6–12)
POTASSIUM SERPL-SCNC: 4.3 MMOL/L (ref 3.5–5.2)
PROT SERPL-MCNC: 7.5 G/DL (ref 6–8.5)
RBC # BLD AUTO: 4.13 10*6/MM3 (ref 4.14–5.8)
SODIUM SERPL-SCNC: 137 MMOL/L (ref 136–145)
TESTOST SERPL-MCNC: 486 NG/DL (ref 249–836)
TRIGL SERPL-MCNC: 200 MG/DL (ref 0–150)
TSH SERPL DL<=0.05 MIU/L-ACNC: 1.07 UIU/ML (ref 0.27–4.2)
VIT B12 BLD-MCNC: 478 PG/ML (ref 211–946)
VLDLC SERPL-MCNC: 35 MG/DL (ref 5–40)
WBC # BLD AUTO: 7.28 10*3/MM3 (ref 3.4–10.8)

## 2021-11-10 ENCOUNTER — TELEPHONE (OUTPATIENT)
Dept: FAMILY MEDICINE CLINIC | Facility: CLINIC | Age: 46
End: 2021-11-10

## 2021-11-10 NOTE — TELEPHONE ENCOUNTER
Per LISBETH Leroy, Mr. Mims has been called with recent lab results & recommendations.  Continue current medications and follow-up as planned or sooner if any problems.         ----- Message from LISBETH De La Rosa sent at 11/10/2021  7:41 AM CST -----  Can you let him know his labs look good, make sure he keeps his appointment with me.,

## 2021-11-10 NOTE — PROGRESS NOTES
Per LISBETH Leroy,  Mims has been called with recent lab results & recommendations.  Continue current medications and follow-up as planned or sooner if any problems.

## 2021-11-11 ENCOUNTER — PATIENT ROUNDING (BHMG ONLY) (OUTPATIENT)
Dept: PODIATRY | Facility: CLINIC | Age: 46
End: 2021-11-11

## 2021-11-11 NOTE — PROGRESS NOTES
November 11, 2021    Hello, may I speak with Noman Mims?    My name is Suzanne Hall    I am  with [facility]  [address] [place] [address] [phone].    Before we get started may I verify your date of birth? 1975    I am calling to officially welcome you to our practice and ask about your recent visit. Is this a good time to talk? no    Left voicemail for patient

## 2021-11-18 RX ORDER — PROMETHAZINE HYDROCHLORIDE 25 MG/1
TABLET ORAL
Qty: 20 TABLET | Refills: 1 | Status: SHIPPED | OUTPATIENT
Start: 2021-11-18 | End: 2021-12-06 | Stop reason: SDUPTHER

## 2021-12-06 ENCOUNTER — OFFICE VISIT (OUTPATIENT)
Dept: FAMILY MEDICINE CLINIC | Facility: CLINIC | Age: 46
End: 2021-12-06

## 2021-12-06 VITALS
HEIGHT: 70 IN | SYSTOLIC BLOOD PRESSURE: 136 MMHG | WEIGHT: 238 LBS | TEMPERATURE: 97.5 F | BODY MASS INDEX: 34.07 KG/M2 | DIASTOLIC BLOOD PRESSURE: 80 MMHG

## 2021-12-06 DIAGNOSIS — G25.81 RESTLESS LEG SYNDROME: ICD-10-CM

## 2021-12-06 DIAGNOSIS — I10 ESSENTIAL HYPERTENSION: Primary | ICD-10-CM

## 2021-12-06 DIAGNOSIS — Z12.11 SCREENING FOR COLON CANCER: ICD-10-CM

## 2021-12-06 DIAGNOSIS — E11.49 OTHER DIABETIC NEUROLOGICAL COMPLICATION ASSOCIATED WITH TYPE 2 DIABETES MELLITUS (HCC): ICD-10-CM

## 2021-12-06 DIAGNOSIS — R79.89 LOW TESTOSTERONE IN MALE: ICD-10-CM

## 2021-12-06 DIAGNOSIS — R53.83 MALAISE AND FATIGUE: ICD-10-CM

## 2021-12-06 DIAGNOSIS — E11.49 TYPE 2 DIABETES MELLITUS WITH OTHER NEUROLOGIC COMPLICATION, WITHOUT LONG-TERM CURRENT USE OF INSULIN (HCC): ICD-10-CM

## 2021-12-06 DIAGNOSIS — R53.81 MALAISE AND FATIGUE: ICD-10-CM

## 2021-12-06 PROCEDURE — 99214 OFFICE O/P EST MOD 30 MIN: CPT | Performed by: NURSE PRACTITIONER

## 2021-12-06 RX ORDER — GABAPENTIN 300 MG/1
300 CAPSULE ORAL 3 TIMES DAILY
Qty: 90 CAPSULE | Refills: 2 | Status: SHIPPED | OUTPATIENT
Start: 2021-12-06 | End: 2022-08-05

## 2021-12-06 RX ORDER — VALSARTAN AND HYDROCHLOROTHIAZIDE 320; 12.5 MG/1; MG/1
1 TABLET, FILM COATED ORAL DAILY
Qty: 90 TABLET | Refills: 3 | Status: SHIPPED | OUTPATIENT
Start: 2021-12-06 | End: 2022-07-12

## 2021-12-06 RX ORDER — TIZANIDINE 4 MG/1
4 TABLET ORAL EVERY 8 HOURS PRN
Qty: 90 TABLET | Refills: 5 | Status: SHIPPED | OUTPATIENT
Start: 2021-12-06 | End: 2022-07-12

## 2021-12-06 RX ORDER — MELOXICAM 15 MG/1
15 TABLET ORAL DAILY
Qty: 90 TABLET | Refills: 3 | Status: SHIPPED | OUTPATIENT
Start: 2021-12-06 | End: 2022-08-05

## 2021-12-06 RX ORDER — BUSPIRONE HYDROCHLORIDE 10 MG/1
10 TABLET ORAL 3 TIMES DAILY PRN
Qty: 90 TABLET | Refills: 3 | Status: SHIPPED | OUTPATIENT
Start: 2021-12-06 | End: 2022-08-05

## 2021-12-06 RX ORDER — PROMETHAZINE HYDROCHLORIDE 25 MG/1
25 TABLET ORAL EVERY 6 HOURS PRN
Qty: 30 TABLET | Refills: 2 | Status: SHIPPED | OUTPATIENT
Start: 2021-12-06 | End: 2022-02-02

## 2021-12-06 RX ORDER — DULAGLUTIDE 0.75 MG/.5ML
0.75 INJECTION, SOLUTION SUBCUTANEOUS WEEKLY
Qty: 4 PEN | Refills: 11 | Status: SHIPPED | OUTPATIENT
Start: 2021-12-06 | End: 2022-08-10 | Stop reason: SDUPTHER

## 2021-12-06 RX ORDER — PANTOPRAZOLE SODIUM 20 MG/1
20 TABLET, DELAYED RELEASE ORAL AS NEEDED
Qty: 90 TABLET | Refills: 3 | Status: SHIPPED | OUTPATIENT
Start: 2021-12-06 | End: 2022-10-24

## 2021-12-06 RX ORDER — METOPROLOL SUCCINATE 25 MG/1
25 TABLET, EXTENDED RELEASE ORAL DAILY
Qty: 90 TABLET | Refills: 3 | Status: SHIPPED | OUTPATIENT
Start: 2021-12-06 | End: 2023-01-16

## 2021-12-06 NOTE — PROGRESS NOTES
Chief Complaint   Patient presents with   • Hypertension   • Diabetes   • GI Problem     stomach flared up since sat   • Med Refill     Subjective   Noman Mims is a 46 y.o. male.           Fatigue  This is a recurrent problem. The current episode started more than 1 year ago. The problem occurs daily. The problem has been waxing and waning. Associated symptoms include arthralgias, diaphoresis, fatigue, joint swelling, myalgias and nausea. Pertinent negatives include no abdominal pain, chest pain, chills, congestion, coughing, headaches, neck pain, numbness, rash, sore throat, swollen glands, urinary symptoms, vertigo, visual change or weakness. He has tried rest (testosterone ) for the symptoms.   Hypertension  This is a recurrent problem. The current episode started more than 1 year ago. The problem has been resolved since onset. The problem is controlled. Pertinent negatives include no blurred vision, chest pain, headaches, malaise/fatigue, neck pain, palpitations, peripheral edema, PND, shortness of breath or sweats. Risk factors for coronary artery disease include diabetes mellitus and sedentary lifestyle. Past treatments include beta blockers. Current antihypertension treatment includes angiotensin blockers, beta blockers and lifestyle changes. There is no history of angina, kidney disease, CAD/MI, CVA, heart failure, left ventricular hypertrophy, PVD or retinopathy. There is no history of chronic renal disease, coarctation of the aorta, hyperaldosteronism, hypercortisolism, a hypertension causing med, pheochromocytoma, renovascular disease or sleep apnea.   Diabetes  He presents for his follow-up diabetic visit. He has type 2 diabetes mellitus. His disease course has been stable. Pertinent negatives for hypoglycemia include no confusion, dizziness, headaches, nervousness/anxiousness, pallor, seizures, speech difficulty, sweats or tremors. Associated symptoms include fatigue. Pertinent negatives for  diabetes include no blurred vision, no chest pain, no foot paresthesias, no foot ulcerations, no polydipsia, no polyphagia, no polyuria, no visual change, no weakness and no weight loss. There are no hypoglycemic complications. There are no diabetic complications. Pertinent negatives for diabetic complications include no CVA, PVD or retinopathy. His weight is stable. He is following a generally unhealthy diet. He has not had a previous visit with a dietitian. There is no change in his home blood glucose trend. He does not see a podiatrist.Eye exam is not current.   Leg Pain   The quality of the pain is described as burning. The pain is at a severity of 3/10. The pain is mild. The pain has been fluctuating since onset. Pertinent negatives include no inability to bear weight, loss of motion, loss of sensation, muscle weakness, numbness or tingling. He has tried acetaminophen and NSAIDs for the symptoms. The treatment provided mild relief.        The following portions of the patient's history were reviewed and updated as appropriate: allergies, current medications, past social history and problem list.    Review of Systems   Constitutional: Positive for diaphoresis and fatigue. Negative for activity change, appetite change, chills, malaise/fatigue, unexpected weight change and weight loss.   HENT: Negative for congestion, dental problem, drooling, ear discharge, ear pain, facial swelling, hearing loss, mouth sores, nosebleeds, postnasal drip, rhinorrhea, sinus pressure, sinus pain, sneezing, sore throat, tinnitus, trouble swallowing and voice change.    Eyes: Negative.  Negative for blurred vision, photophobia, discharge, redness, itching and visual disturbance.   Respiratory: Negative.  Negative for apnea, cough, choking, chest tightness, shortness of breath, wheezing and stridor.    Cardiovascular: Negative.  Negative for chest pain, palpitations, leg swelling and PND.   Gastrointestinal: Positive for nausea.  "Negative for abdominal distention, abdominal pain, anal bleeding, blood in stool, constipation, diarrhea and rectal pain.   Endocrine: Negative.  Negative for cold intolerance, heat intolerance, polydipsia, polyphagia and polyuria.   Genitourinary: Negative.  Negative for difficulty urinating and dysuria.   Musculoskeletal: Positive for arthralgias, back pain, gait problem, joint swelling and myalgias. Negative for neck pain and neck stiffness.   Skin: Negative for color change, pallor, rash and wound.   Allergic/Immunologic: Negative for environmental allergies, food allergies and immunocompromised state.   Neurological: Negative for dizziness, vertigo, tingling, tremors, seizures, syncope, facial asymmetry, speech difficulty, weakness, light-headedness, numbness and headaches.   Hematological: Negative.  Negative for adenopathy. Does not bruise/bleed easily.   Psychiatric/Behavioral: Negative.  Negative for agitation, behavioral problems, confusion, decreased concentration, dysphoric mood, hallucinations, self-injury, sleep disturbance and suicidal ideas. The patient is not nervous/anxious and is not hyperactive.        Objective   /80   Temp 97.5 °F (36.4 °C) (Temporal)   Ht 177.8 cm (70\")   Wt 108 kg (238 lb)   BMI 34.15 kg/m²   Physical Exam  Vitals and nursing note reviewed.   Constitutional:       General: He is not in acute distress.     Appearance: Normal appearance. He is normal weight. He is not ill-appearing, toxic-appearing or diaphoretic.   HENT:      Head: Normocephalic and atraumatic.      Right Ear: Tympanic membrane normal. There is no impacted cerumen.      Left Ear: Tympanic membrane normal. There is no impacted cerumen.      Nose: Nose normal. No congestion or rhinorrhea.      Mouth/Throat:      Mouth: Mucous membranes are dry.      Pharynx: No oropharyngeal exudate or posterior oropharyngeal erythema.   Eyes:      General: No visual field deficit.        Right eye: No discharge.       "   Left eye: No discharge.      Extraocular Movements: Extraocular movements intact.      Pupils: Pupils are equal, round, and reactive to light.   Neck:      Vascular: No carotid bruit.   Cardiovascular:      Rate and Rhythm: Normal rate and regular rhythm.      Pulses:           Dorsalis pedis pulses are 3+ on the right side and 3+ on the left side.        Posterior tibial pulses are 3+ on the right side and 3+ on the left side.      Heart sounds: Normal heart sounds. No murmur heard.  No friction rub. No gallop.    Pulmonary:      Effort: Pulmonary effort is normal. No respiratory distress.      Breath sounds: No stridor. No wheezing, rhonchi or rales.   Chest:      Chest wall: No tenderness.   Abdominal:      General: Abdomen is flat. There is no distension.      Palpations: Abdomen is soft. There is no mass.      Tenderness: There is no abdominal tenderness. There is no right CVA tenderness, left CVA tenderness, guarding or rebound.      Hernia: No hernia is present.   Musculoskeletal:         General: No swelling, tenderness, deformity or signs of injury. Normal range of motion.      Cervical back: Normal range of motion. No rigidity or tenderness.      Right lower leg: No edema.      Left lower leg: No edema.      Right foot: Normal range of motion. No deformity or bunion.      Left foot: Normal range of motion. No deformity or bunion.   Feet:      Right foot:      Protective Sensation: 6 sites tested. 6 sites sensed.      Skin integrity: Skin integrity normal. No ulcer, blister or skin breakdown.      Left foot:      Protective Sensation: 6 sites tested. 6 sites sensed.      Skin integrity: Skin integrity normal. No ulcer, blister or skin breakdown.      Toenail Condition: Left toenails are normal.   Lymphadenopathy:      Cervical: No cervical adenopathy.   Skin:     General: Skin is warm.      Capillary Refill: Capillary refill takes less than 2 seconds.      Coloration: Skin is not jaundiced or pale.       Findings: No bruising, erythema, lesion or rash.   Neurological:      General: No focal deficit present.      Mental Status: He is alert and oriented to person, place, and time.      Cranial Nerves: No cranial nerve deficit or dysarthria.      Sensory: No sensory deficit.      Motor: No weakness, tremor, atrophy, abnormal muscle tone, seizure activity or pronator drift.      Coordination: Romberg sign negative. Coordination normal. Finger-Nose-Finger Test and Heel to Shin Test normal. Rapid alternating movements normal.      Gait: Gait and tandem walk normal.      Deep Tendon Reflexes: Reflexes normal.   Psychiatric:         Mood and Affect: Mood normal.         Behavior: Behavior normal.              Assessment/Plan     Problems Addressed this Visit        Cardiac and Vasculature    Essential hypertension - Primary    Relevant Medications    gabapentin (Neurontin) 300 MG capsule    metoprolol succinate XL (TOPROL-XL) 25 MG 24 hr tablet    pantoprazole (PROTONIX) 20 MG EC tablet    valsartan-hydrochlorothiazide (DIOVAN-HCT) 320-12.5 MG per tablet       Endocrine and Metabolic    Low testosterone in male    Relevant Medications    gabapentin (Neurontin) 300 MG capsule    metoprolol succinate XL (TOPROL-XL) 25 MG 24 hr tablet    pantoprazole (PROTONIX) 20 MG EC tablet    Diabetes mellitus (HCC)    Relevant Medications    Dulaglutide (Trulicity) 0.75 MG/0.5ML solution pen-injector    gabapentin (Neurontin) 300 MG capsule    metoprolol succinate XL (TOPROL-XL) 25 MG 24 hr tablet    pantoprazole (PROTONIX) 20 MG EC tablet    valsartan-hydrochlorothiazide (DIOVAN-HCT) 320-12.5 MG per tablet       Neuro    Restless leg syndrome    Relevant Medications    gabapentin (Neurontin) 300 MG capsule    metoprolol succinate XL (TOPROL-XL) 25 MG 24 hr tablet    pantoprazole (PROTONIX) 20 MG EC tablet    Diabetic neuropathy (HCC)    Relevant Medications    Dulaglutide (Trulicity) 0.75 MG/0.5ML solution pen-injector    gabapentin  (Neurontin) 300 MG capsule    metoprolol succinate XL (TOPROL-XL) 25 MG 24 hr tablet    pantoprazole (PROTONIX) 20 MG EC tablet      Other Visit Diagnoses     Malaise and fatigue        Relevant Orders    Ambulatory Referral to Sleep Medicine    Screening for colon cancer        Relevant Orders    Ambulatory Referral to Gastroenterology      Diagnoses       Codes Comments    Essential hypertension    -  Primary ICD-10-CM: I10  ICD-9-CM: 401.9     Malaise and fatigue     ICD-10-CM: R53.81, R53.83  ICD-9-CM: 780.79     Type 2 diabetes mellitus with other neurologic complication, without long-term current use of insulin (HCC)     ICD-10-CM: E11.49  ICD-9-CM: 250.60     Low testosterone in male     ICD-10-CM: R79.89  ICD-9-CM: 790.99     Other diabetic neurological complication associated with type 2 diabetes mellitus (HCC)     ICD-10-CM: E11.49  ICD-9-CM: 250.60     Restless leg syndrome     ICD-10-CM: G25.81  ICD-9-CM: 333.94     Screening for colon cancer     ICD-10-CM: Z12.11  ICD-9-CM: V76.51            New Medications Ordered This Visit   Medications   • busPIRone (BUSPAR) 10 MG tablet     Sig: Take 1 tablet by mouth 3 (Three) Times a Day As Needed (tid prn).     Dispense:  90 tablet     Refill:  3   • Dulaglutide (Trulicity) 0.75 MG/0.5ML solution pen-injector     Sig: Inject 0.75 mg under the skin into the appropriate area as directed 1 (One) Time Per Week.     Dispense:  4 pen     Refill:  11   • gabapentin (Neurontin) 300 MG capsule     Sig: Take 1 capsule by mouth 3 (Three) Times a Day.     Dispense:  90 capsule     Refill:  2   • meloxicam (MOBIC) 15 MG tablet     Sig: Take 1 tablet by mouth Daily. Take once daily.     Dispense:  90 tablet     Refill:  3   • metoprolol succinate XL (TOPROL-XL) 25 MG 24 hr tablet     Sig: Take 1 tablet by mouth Daily.     Dispense:  90 tablet     Refill:  3   • pantoprazole (PROTONIX) 20 MG EC tablet     Sig: Take 1 tablet by mouth As Needed for Heartburn.     Dispense:  90  tablet     Refill:  3   • promethazine (PHENERGAN) 25 MG tablet     Sig: Take 1 tablet by mouth Every 6 (Six) Hours As Needed for Nausea or Vomiting.     Dispense:  30 tablet     Refill:  2     This prescription was filled on 11/18/2021. Any refills authorized will be placed on file.   • tiZANidine (ZANAFLEX) 4 MG tablet     Sig: Take 1 tablet by mouth Every 8 (Eight) Hours As Needed for Muscle Spasms.     Dispense:  90 tablet     Refill:  5   • valsartan-hydrochlorothiazide (DIOVAN-HCT) 320-12.5 MG per tablet     Sig: Take 1 tablet by mouth Daily.     Dispense:  90 tablet     Refill:  3     Current Outpatient Medications on File Prior to Visit   Medication Sig Dispense Refill   • cloNIDine (Catapres) 0.1 MG tablet Use if blood pressure is 180/90 or above bid 60 tablet 5   • [DISCONTINUED] busPIRone (BUSPAR) 10 MG tablet Take 1 tablet by mouth 3 (Three) Times a Day As Needed (tid prn). 90 tablet 3   • [DISCONTINUED] Dulaglutide (Trulicity) 0.75 MG/0.5ML solution pen-injector Inject 0.75 mg under the skin into the appropriate area as directed 1 (One) Time Per Week. 4 pen 11   • [DISCONTINUED] gabapentin (Neurontin) 300 MG capsule Take 1 capsule by mouth 3 (Three) Times a Day. 90 capsule 2   • [DISCONTINUED] meloxicam (MOBIC) 15 MG tablet Take 1 tablet by mouth Daily. Take once daily. 90 tablet 1   • [DISCONTINUED] metoprolol succinate XL (TOPROL-XL) 25 MG 24 hr tablet Take 1 tablet by mouth Daily. 30 tablet 11   • [DISCONTINUED] pantoprazole (PROTONIX) 20 MG EC tablet Take 1 tablet by mouth As Needed for Heartburn. 30 tablet 11   • [DISCONTINUED] promethazine (PHENERGAN) 25 MG tablet TAKE 1 TABLET BY MOUTH EVERY 6 TO 8 HOURS AS NEEDED for nausea or vomiting 20 tablet 1   • [DISCONTINUED] tiZANidine (ZANAFLEX) 4 MG tablet Take 1 tablet by mouth Every 8 (Eight) Hours As Needed for Muscle Spasms. 90 tablet 5   • [DISCONTINUED] valsartan-hydrochlorothiazide (DIOVAN-HCT) 320-12.5 MG per tablet Take 1 tablet by mouth  Daily. 30 tablet 11   • [DISCONTINUED] citalopram (CeleXA) 10 MG tablet Take 1 tablet by mouth Daily. (Patient taking differently: Take 10 mg by mouth As Needed.) 30 tablet 11   • [DISCONTINUED] pregabalin (Lyrica) 75 MG capsule Take 1 capsule by mouth 2 (Two) Times a Day. 60 capsule 2   • [DISCONTINUED] triamcinolone (KENALOG) 0.1 % ointment Apply 1 application topically to the appropriate area as directed 3 (Three) Times a Day. 80 g 3     No current facility-administered medications on file prior to visit.       20 minutes  Follow Up   Return in about 3 months (around 3/6/2022).        It's not just what you eat, but when you eat  Eat breakfast, and eat smaller meals throughout the day. A healthy breakfast can jumpstart your metabolism, while eating small, healthy meals (rather than the standard three large meals) keeps your energy up.   Avoid eating at night. Try to eat dinner earlier and fast for 14-16 hours until breakfast the next morning. Studies suggest that eating only when you’re most active and giving your digestive system a long break each day may help to regulate weight.     Refill meds as directed -suggest colonoscopy as directed -eye exam     meds as directed -see back in 3 months, refer back to sleep medicine-hx of sleep apnea in the past

## 2022-01-14 ENCOUNTER — PREP FOR SURGERY (OUTPATIENT)
Dept: OTHER | Facility: HOSPITAL | Age: 47
End: 2022-01-14

## 2022-01-14 DIAGNOSIS — Z12.11 ENCOUNTER FOR SCREENING FOR MALIGNANT NEOPLASM OF COLON: Primary | ICD-10-CM

## 2022-01-14 DIAGNOSIS — Z96.649 HISTORY OF HIP REPLACEMENT: ICD-10-CM

## 2022-01-14 RX ORDER — DEXTROSE AND SODIUM CHLORIDE 5; .45 G/100ML; G/100ML
30 INJECTION, SOLUTION INTRAVENOUS CONTINUOUS PRN
Status: CANCELLED | OUTPATIENT
Start: 2022-03-18

## 2022-02-02 RX ORDER — PROMETHAZINE HYDROCHLORIDE 25 MG/1
TABLET ORAL
Qty: 30 TABLET | Refills: 2 | Status: SHIPPED | OUTPATIENT
Start: 2022-02-02 | End: 2022-04-08 | Stop reason: SDUPTHER

## 2022-03-16 RX ORDER — MULTIPLE VITAMINS W/ MINERALS TAB 9MG-400MCG
1 TAB ORAL DAILY
COMMUNITY

## 2022-03-16 RX ORDER — MULTIVIT-MIN/IRON/FOLIC ACID/K 18-600-40
CAPSULE ORAL DAILY
COMMUNITY

## 2022-03-16 RX ORDER — CALCIUM CARBONATE/VITAMIN D3 500-10/5ML
LIQUID (ML) ORAL DAILY
COMMUNITY

## 2022-03-18 ENCOUNTER — ANESTHESIA EVENT (OUTPATIENT)
Dept: GASTROENTEROLOGY | Facility: HOSPITAL | Age: 47
End: 2022-03-18

## 2022-03-18 ENCOUNTER — ANESTHESIA (OUTPATIENT)
Dept: GASTROENTEROLOGY | Facility: HOSPITAL | Age: 47
End: 2022-03-18

## 2022-03-18 ENCOUNTER — HOSPITAL ENCOUNTER (OUTPATIENT)
Facility: HOSPITAL | Age: 47
Setting detail: HOSPITAL OUTPATIENT SURGERY
Discharge: HOME | End: 2022-03-18
Attending: INTERNAL MEDICINE | Admitting: INTERNAL MEDICINE

## 2022-03-18 VITALS
TEMPERATURE: 97.3 F | OXYGEN SATURATION: 97 % | WEIGHT: 224.87 LBS | DIASTOLIC BLOOD PRESSURE: 80 MMHG | HEART RATE: 90 BPM | SYSTOLIC BLOOD PRESSURE: 151 MMHG | HEIGHT: 69 IN | RESPIRATION RATE: 18 BRPM | BODY MASS INDEX: 33.31 KG/M2

## 2022-03-18 DIAGNOSIS — Z12.11 ENCOUNTER FOR SCREENING FOR MALIGNANT NEOPLASM OF COLON: ICD-10-CM

## 2022-03-18 DIAGNOSIS — Z96.649 HISTORY OF HIP REPLACEMENT: ICD-10-CM

## 2022-03-18 LAB — GLUCOSE BLDC GLUCOMTR-MCNC: 132 MG/DL (ref 70–130)

## 2022-03-18 PROCEDURE — 82962 GLUCOSE BLOOD TEST: CPT

## 2022-03-18 PROCEDURE — 25010000002 CEFTRIAXONE IN SWFI 1 GRAM/10ML IV PUSH SYRINGE (SIMPLE): Performed by: INTERNAL MEDICINE

## 2022-03-18 PROCEDURE — 25010000002 PROPOFOL 10 MG/ML EMULSION: Performed by: NURSE ANESTHETIST, CERTIFIED REGISTERED

## 2022-03-18 RX ORDER — DEXTROSE AND SODIUM CHLORIDE 5; .45 G/100ML; G/100ML
30 INJECTION, SOLUTION INTRAVENOUS CONTINUOUS PRN
Status: DISCONTINUED | OUTPATIENT
Start: 2022-03-18 | End: 2022-03-18 | Stop reason: HOSPADM

## 2022-03-18 RX ORDER — LIDOCAINE HYDROCHLORIDE 20 MG/ML
INJECTION, SOLUTION INTRAVENOUS AS NEEDED
Status: DISCONTINUED | OUTPATIENT
Start: 2022-03-18 | End: 2022-03-18 | Stop reason: SURG

## 2022-03-18 RX ORDER — PROPOFOL 10 MG/ML
VIAL (ML) INTRAVENOUS AS NEEDED
Status: DISCONTINUED | OUTPATIENT
Start: 2022-03-18 | End: 2022-03-18 | Stop reason: SURG

## 2022-03-18 RX ORDER — ONDANSETRON 2 MG/ML
4 INJECTION INTRAMUSCULAR; INTRAVENOUS ONCE AS NEEDED
Status: DISCONTINUED | OUTPATIENT
Start: 2022-03-18 | End: 2022-03-18 | Stop reason: HOSPADM

## 2022-03-18 RX ADMIN — DEXTROSE AND SODIUM CHLORIDE 30 ML/HR: 5; 450 INJECTION, SOLUTION INTRAVENOUS at 13:19

## 2022-03-18 RX ADMIN — CEFTRIAXONE SODIUM 1 G: 1 INJECTION, POWDER, FOR SOLUTION INTRAMUSCULAR; INTRAVENOUS at 13:36

## 2022-03-18 RX ADMIN — PROPOFOL 50 MG: 10 INJECTION, EMULSION INTRAVENOUS at 13:46

## 2022-03-18 RX ADMIN — LIDOCAINE HYDROCHLORIDE 50 MG: 20 INJECTION, SOLUTION INTRAVENOUS at 13:37

## 2022-03-18 RX ADMIN — PROPOFOL 50 MG: 10 INJECTION, EMULSION INTRAVENOUS at 13:42

## 2022-03-18 RX ADMIN — PROPOFOL 20 MG: 10 INJECTION, EMULSION INTRAVENOUS at 13:49

## 2022-03-18 RX ADMIN — PROPOFOL 200 MG: 10 INJECTION, EMULSION INTRAVENOUS at 13:37

## 2022-03-18 NOTE — H&P
Royer Madsen DO,[facility]  Gastroenterology  Hepatology  Endoscopy  Board Certified in Internal Medicine and gastroenterology  [address], [address]  [place]. [address]  - [phone]   F - [phone]     GASTROENTEROLOGY HISTORY AND PHYSICAL  NOTE   ROYER MADSEN DO.         SUBJECTIVE:   3/18/2022    Name: Noman Mims  DOD: 1975    Chief Complaint:       Subjective : Screen for colon cancer    Patient is 46 y.o. male presents with desire for elective colonoscopy.      ROS/HISTORY/ CURRENT MEDICATIONS/OBJECTIVE/VS/PE:   Review of Systems:  All systems unremarkable unless specified below.  Constitutional   HENT  Eyes   Respiratory    Cardiovascular  Gastrointestinal   Endocrine  Genitourinary    Musculoskeletal   Skin  Allergic/Immunologic    Neurological    Hematological  Psychiatric/Behavioral    History:     Past Medical History:   Diagnosis Date   • Alpha galactosidase deficiency    • Callus    • Diabetes mellitus (HCC)    • Hypertension      Past Surgical History:   Procedure Laterality Date   • JOINT REPLACEMENT Right     hip   • KNEE MENISCECTOMY     • TOTAL HIP ARTHROPLASTY     • TOTAL SHOULDER ARTHROPLASTY Right      Family History   Problem Relation Age of Onset   • Heart disease Father    • Diabetes Father    • Cancer Maternal Grandmother      Social History     Tobacco Use   • Smoking status: Never Smoker   • Smokeless tobacco: Current User     Types: Chew   Vaping Use   • Vaping Use: Never used   Substance Use Topics   • Alcohol use: Yes     Alcohol/week: 3.0 standard drinks     Types: 3 Cans of beer per week     Comment: drinks a beer every couple days   • Drug use: Defer     Prior to Admission medications    Medication Sig Start Date End Date Taking? Authorizing Provider   Ascorbic Acid (Vitamin C) 500 MG capsule Take  by mouth Daily.   Yes Provider, MD Jose   gabapentin (Neurontin) 300 MG capsule Take 1 capsule by mouth 3 (Three) Times a Day. 12/6/21  Yes  Mariaa Rebolledo APRN   metoprolol succinate XL (TOPROL-XL) 25 MG 24 hr tablet Take 1 tablet by mouth Daily. 12/6/21  Yes Mariaa Rebolledo APRN   multivitamin with minerals tablet tablet Take 1 tablet by mouth Daily.   Yes ProviderJose MD   pantoprazole (PROTONIX) 20 MG EC tablet Take 1 tablet by mouth As Needed for Heartburn. 12/6/21  Yes Mariaa Rebolledo APRN   tiZANidine (ZANAFLEX) 4 MG tablet Take 1 tablet by mouth Every 8 (Eight) Hours As Needed for Muscle Spasms. 12/6/21  Yes Mariaa Rebolledo APRN   valsartan-hydrochlorothiazide (DIOVAN-HCT) 320-12.5 MG per tablet Take 1 tablet by mouth Daily. 12/6/21  Yes Mariaa Rebolledo APRN   Zinc 30 MG capsule Take  by mouth Daily.   Yes ProviderJose MD   busPIRone (BUSPAR) 10 MG tablet Take 1 tablet by mouth 3 (Three) Times a Day As Needed (tid prn). 12/6/21   Mariaa Rebolledo APRN   cloNIDine (Catapres) 0.1 MG tablet Use if blood pressure is 180/90 or above bid 4/29/20   Mariaa Rebolledo APRN   Dulaglutide (Trulicity) 0.75 MG/0.5ML solution pen-injector Inject 0.75 mg under the skin into the appropriate area as directed 1 (One) Time Per Week. 12/6/21   Mariaa Rebolledo APRN   meloxicam (MOBIC) 15 MG tablet Take 1 tablet by mouth Daily. Take once daily. 12/6/21   Mariaa Rebolledo APRN   promethazine (PHENERGAN) 25 MG tablet TAKE 1 TABLET BY MOUTH EVERY 6 HOURS AS NEEDED FOR NAUSEA / FOR VOMITING 2/2/22   Mariaa Rebolledo APRN     Allergies:  Alpha-gal    I have reviewed the patients medical history, surgical history and family history in the available medical record system.     Current Medications:     Current Facility-Administered Medications   Medication Dose Route Frequency Provider Last Rate Last Admin   • cefTRIAXone (ROCEPHIN) in SWFI 1 gram/10ml IV PUSH syringe  1 g Intravenous Once Steve Robledo DO       • dextrose 5 % and sodium chloride 0.45 % infusion  30 mL/hr Intravenous  Continuous PRN Steve Robledo DO 30 mL/hr at 03/18/22 1319 30 mL/hr at 03/18/22 1319   • ondansetron (ZOFRAN) injection 4 mg  4 mg Intravenous Once PRN Anastacia Landa CRNA           Objective     Physical Exam:   Temp:  [98.4 °F (36.9 °C)] 98.4 °F (36.9 °C)  Heart Rate:  [85] 85  Resp:  [18] 18  BP: (188)/(66) 188/66    Physical Exam:  General Appearance:    Alert, cooperative, in no acute distress   Head:    Normocephalic, without obvious abnormality, atraumatic   Eyes:            Lids and lashes normal, conjunctivae and sclerae normal, no icterus, no pallor, corneas clear, PERRLA   Ears:    Ears appear intact with no abnormalities noted   Throat:   No oral lesions, no thrush, oral mucosa moist   Neck:   No adenopathy, supple, trachea midline, no thyromegaly, no  carotid bruit, no JVD   Back:     No kyphosis present, no scoliosis present, no skin lesions,   erythema or scars, no tenderness to percussion or                 palpation,  range of motion normal   Lungs:     Clear to auscultation,respirations regular, even and         unlabored    Heart:    Regular rhythm and normal rate, normal S1 and S2, no  murmur, no gallop, no rub, no click   Breast Exam:    Deferred   Abdomen:     Normal bowel sounds, no masses, no organomegaly, soft  nontender, nondistended, no guarding, no rebound                 tenderness   Genitalia:    Deferred   Extremities:   Moves all extremities well, no edema, no cyanosis, no          redness   Pulses:   Pulses palpable and equal bilaterally   Skin:   No bleeding, bruising or rash   Lymph nodes:   No palpable adenopathy   Neurologic:   Cranial nerves 2 - 12 grossly intact, sensation intact, DTR     present and equal bilaterally      Results Review:     Lab Results   Component Value Date    WBC 7.28 11/08/2021    WBC 5.68 09/13/2021    WBC 4.86 08/07/2020    HGB 13.7 11/08/2021    HGB 13.1 09/13/2021    HGB 16.2 08/07/2020    HCT 39.1 11/08/2021    HCT 37.0 (L) 09/13/2021    HCT  46.0 08/07/2020     11/08/2021     09/13/2021     08/07/2020             No results found for: LIPASE  Lab Results   Component Value Date    INR 1.05 07/26/2018     No results found for: CULTURE    Radiology Review:  Imaging Results (Last 72 Hours)     ** No results found for the last 72 hours. **           I reviewed the patient's new clinical results.  I reviewed the patient's new imaging results and agree with the interpretation.     ASSESSMENT/PLAN:   ASSESSMENT:  1.  Screen for colon cancer    PLAN:  1.  Colonoscopy    Risk and benefits associated with the procedure are reviewed with the patient.  The patient wished to proceed     Steve Robledo DO  03/18/22  13:31 CDT

## 2022-03-18 NOTE — ANESTHESIA PREPROCEDURE EVALUATION
Anesthesia Evaluation     Patient summary reviewed   no history of anesthetic complications:  NPO Solid Status: > 8 hours  NPO Liquid Status: > 2 hours           Airway   Mallampati: III  TM distance: >3 FB  Neck ROM: full  Possible difficult intubation  Dental - normal exam         Pulmonary - normal exam   (+) recent URI, sleep apnea on CPAP,   (-) not a smoker  Cardiovascular - normal exam  Exercise tolerance: good (4-7 METS)    Patient on routine beta blocker and Beta blocker given within 24 hours of surgery  Rhythm: regular  Rate: normal    (+) hypertension 2 medications or greater,       Neuro/Psych  (+) numbness, psychiatric history Anxiety,    GI/Hepatic/Renal/Endo    (+) obesity,  GERD,  liver disease cirrhosis, diabetes mellitus type 2,     Musculoskeletal     (+) chronic pain,   Abdominal   (+) obese,    Substance History   (+) alcohol use,   (-) drug use     OB/GYN negative ob/gyn ROS         Other                      Anesthesia Plan    ASA 3     MAC     intravenous induction     Anesthetic plan, all risks, benefits, and alternatives have been provided, discussed and informed consent has been obtained with: patient.        CODE STATUS:

## 2022-03-24 LAB
LAB AP CASE REPORT: NORMAL
PATH REPORT.FINAL DX SPEC: NORMAL

## 2022-04-08 RX ORDER — PROMETHAZINE HYDROCHLORIDE 25 MG/1
25 TABLET ORAL EVERY 6 HOURS PRN
Qty: 30 TABLET | Refills: 2 | Status: SHIPPED | OUTPATIENT
Start: 2022-04-08 | End: 2022-08-05

## 2022-04-08 NOTE — TELEPHONE ENCOUNTER
Incoming Refill Request      Medication requested (name and dose): promethazine (PHENERGAN) 25 MG tablet    Pharmacy where request should be sent: [facility], [place], [place]  Additional details provided by patient: NONE    Best call back number: [phone]    Does the patient have less than a 3 day supply:  [] Yes  [x] No    Torie Kruse Rep  04/08/22, 09:16 CDT

## 2022-07-12 DIAGNOSIS — I10 ESSENTIAL HYPERTENSION: ICD-10-CM

## 2022-07-12 DIAGNOSIS — E11.49 TYPE 2 DIABETES MELLITUS WITH OTHER NEUROLOGIC COMPLICATION, WITHOUT LONG-TERM CURRENT USE OF INSULIN: ICD-10-CM

## 2022-07-12 RX ORDER — TIZANIDINE 4 MG/1
TABLET ORAL
Qty: 90 TABLET | Refills: 5 | Status: SHIPPED | OUTPATIENT
Start: 2022-07-12 | End: 2023-02-20

## 2022-07-12 RX ORDER — VALSARTAN AND HYDROCHLOROTHIAZIDE 320; 12.5 MG/1; MG/1
TABLET, FILM COATED ORAL
Qty: 30 TABLET | Refills: 11 | Status: SHIPPED | OUTPATIENT
Start: 2022-07-12

## 2022-08-05 ENCOUNTER — OFFICE VISIT (OUTPATIENT)
Dept: FAMILY MEDICINE CLINIC | Facility: CLINIC | Age: 47
End: 2022-08-05

## 2022-08-05 ENCOUNTER — LAB (OUTPATIENT)
Dept: LAB | Facility: HOSPITAL | Age: 47
End: 2022-08-05

## 2022-08-05 VITALS
SYSTOLIC BLOOD PRESSURE: 120 MMHG | DIASTOLIC BLOOD PRESSURE: 80 MMHG | BODY MASS INDEX: 32.44 KG/M2 | HEART RATE: 100 BPM | OXYGEN SATURATION: 99 % | WEIGHT: 219 LBS | HEIGHT: 69 IN

## 2022-08-05 DIAGNOSIS — R42 DIZZINESS: ICD-10-CM

## 2022-08-05 DIAGNOSIS — R19.7 DIARRHEA, UNSPECIFIED TYPE: Primary | ICD-10-CM

## 2022-08-05 DIAGNOSIS — E86.0 DEHYDRATION: ICD-10-CM

## 2022-08-05 DIAGNOSIS — E87.1 LOW SODIUM LEVELS: ICD-10-CM

## 2022-08-05 DIAGNOSIS — R10.13 EPIGASTRIC PAIN: ICD-10-CM

## 2022-08-05 LAB
ALBUMIN SERPL-MCNC: 5.2 G/DL (ref 3.5–5.2)
ALBUMIN/GLOB SERPL: 1.5 G/DL
ALP SERPL-CCNC: 115 U/L (ref 39–117)
ALT SERPL W P-5'-P-CCNC: 58 U/L (ref 1–41)
AMMONIA BLD-SCNC: 12 UMOL/L (ref 16–60)
AMYLASE SERPL-CCNC: 71 U/L (ref 28–100)
ANION GAP SERPL CALCULATED.3IONS-SCNC: 15 MMOL/L (ref 5–15)
AST SERPL-CCNC: 71 U/L (ref 1–40)
BILIRUB SERPL-MCNC: 1.1 MG/DL (ref 0–1.2)
BUN SERPL-MCNC: 35 MG/DL (ref 6–20)
BUN/CREAT SERPL: 13.8 (ref 7–25)
CALCIUM SPEC-SCNC: 10.9 MG/DL (ref 8.6–10.5)
CHLORIDE SERPL-SCNC: 74 MMOL/L (ref 98–107)
CO2 SERPL-SCNC: 31 MMOL/L (ref 22–29)
CREAT SERPL-MCNC: 2.53 MG/DL (ref 0.76–1.27)
EGFRCR SERPLBLD CKD-EPI 2021: 30.5 ML/MIN/1.73
GLOBULIN UR ELPH-MCNC: 3.4 GM/DL
GLUCOSE SERPL-MCNC: 247 MG/DL (ref 65–99)
LIPASE SERPL-CCNC: 75 U/L (ref 13–60)
POTASSIUM SERPL-SCNC: 3.7 MMOL/L (ref 3.5–5.2)
PROT SERPL-MCNC: 8.6 G/DL (ref 6–8.5)
SODIUM SERPL-SCNC: 120 MMOL/L (ref 136–145)

## 2022-08-05 PROCEDURE — 99213 OFFICE O/P EST LOW 20 MIN: CPT | Performed by: NURSE PRACTITIONER

## 2022-08-05 PROCEDURE — 83540 ASSAY OF IRON: CPT | Performed by: NURSE PRACTITIONER

## 2022-08-05 PROCEDURE — 82306 VITAMIN D 25 HYDROXY: CPT | Performed by: NURSE PRACTITIONER

## 2022-08-05 PROCEDURE — 82607 VITAMIN B-12: CPT | Performed by: NURSE PRACTITIONER

## 2022-08-05 PROCEDURE — 80061 LIPID PANEL: CPT | Performed by: NURSE PRACTITIONER

## 2022-08-05 PROCEDURE — 36415 COLL VENOUS BLD VENIPUNCTURE: CPT | Performed by: NURSE PRACTITIONER

## 2022-08-05 PROCEDURE — 82150 ASSAY OF AMYLASE: CPT | Performed by: NURSE PRACTITIONER

## 2022-08-05 PROCEDURE — 80053 COMPREHEN METABOLIC PANEL: CPT | Performed by: NURSE PRACTITIONER

## 2022-08-05 PROCEDURE — 84443 ASSAY THYROID STIM HORMONE: CPT | Performed by: NURSE PRACTITIONER

## 2022-08-05 PROCEDURE — 82140 ASSAY OF AMMONIA: CPT | Performed by: NURSE PRACTITIONER

## 2022-08-05 PROCEDURE — 83690 ASSAY OF LIPASE: CPT | Performed by: NURSE PRACTITIONER

## 2022-08-05 PROCEDURE — 83036 HEMOGLOBIN GLYCOSYLATED A1C: CPT | Performed by: NURSE PRACTITIONER

## 2022-08-05 RX ORDER — PROCHLORPERAZINE MALEATE 10 MG
10 TABLET ORAL EVERY 6 HOURS PRN
Qty: 40 TABLET | Refills: 6 | Status: SHIPPED | OUTPATIENT
Start: 2022-08-05

## 2022-08-05 RX ORDER — LACTULOSE 10 G/10G
10 SOLUTION ORAL 3 TIMES DAILY
Qty: 30 PACKET | Refills: 1 | Status: SHIPPED | OUTPATIENT
Start: 2022-08-05 | End: 2022-08-05

## 2022-08-05 RX ORDER — PROMETHAZINE HYDROCHLORIDE 25 MG/1
25 TABLET ORAL EVERY 6 HOURS PRN
Qty: 60 TABLET | Refills: 11 | Status: SHIPPED | OUTPATIENT
Start: 2022-08-05

## 2022-08-05 NOTE — PROGRESS NOTES
Chief Complaint   Patient presents with   • Vomiting     X 1 week now   • Diarrhea   • Dizziness     Hypersensitive      Subjective   Noman Mims is a 47 y.o. male.           Vomiting   This is a new problem. The current episode started in the past 7 days. The problem has been waxing and waning. There has been no fever. Associated symptoms include abdominal pain, arthralgias, diarrhea, dizziness and myalgias. Pertinent negatives include no chest pain, chills, coughing, fever, headaches, sweats, URI or weight loss. The treatment provided mild relief.   Diarrhea   This is a recurrent problem. The current episode started more than 1 month ago. The problem has been gradually improving. Associated symptoms include abdominal pain, arthralgias, myalgias and vomiting. Pertinent negatives include no bloating, chills, coughing, fever, headaches, increased  flatus, sweats, URI or weight loss. Treatments tried: increasing fluids  The treatment provided significant relief. There is no history of bowel resection, inflammatory bowel disease, irritable bowel syndrome, malabsorption, a recent abdominal surgery or short gut syndrome.   Dizziness  Associated symptoms include abdominal pain, arthralgias, diaphoresis, fatigue, joint swelling, myalgias, nausea and vomiting. Pertinent negatives include no chest pain, chills, congestion, coughing, fever, headaches, neck pain, rash, sore throat, swollen glands, urinary symptoms, vertigo, visual change or weakness.   Fatigue  This is a recurrent problem. The current episode started more than 1 year ago. The problem occurs daily. The problem has been waxing and waning. Associated symptoms include abdominal pain, arthralgias, diaphoresis, fatigue, joint swelling, myalgias, nausea and vomiting. Pertinent negatives include no chest pain, chills, congestion, coughing, fever, headaches, neck pain, rash, sore throat, swollen glands, urinary symptoms, vertigo, visual change or weakness. He  has tried rest (testosterone ) for the symptoms.   Hypertension  This is a recurrent problem. The current episode started more than 1 year ago. The problem has been resolved since onset. The problem is controlled. Pertinent negatives include no blurred vision, chest pain, headaches, malaise/fatigue, neck pain, palpitations, peripheral edema, PND, shortness of breath or sweats. Risk factors for coronary artery disease include diabetes mellitus and sedentary lifestyle. Past treatments include beta blockers. Current antihypertension treatment includes angiotensin blockers, beta blockers and lifestyle changes. There is no history of angina, kidney disease, CAD/MI, CVA, heart failure, left ventricular hypertrophy, PVD or retinopathy. There is no history of chronic renal disease, coarctation of the aorta, hyperaldosteronism, hypercortisolism, a hypertension causing med, pheochromocytoma, renovascular disease or sleep apnea.   Diabetes  He presents for his follow-up diabetic visit. He has type 2 diabetes mellitus. His disease course has been stable. Hypoglycemia symptoms include dizziness and nervousness/anxiousness. Pertinent negatives for hypoglycemia include no confusion, headaches, pallor, seizures, speech difficulty, sweats or tremors. Associated symptoms include fatigue. Pertinent negatives for diabetes include no blurred vision, no chest pain, no foot paresthesias, no foot ulcerations, no polydipsia, no polyphagia, no polyuria, no visual change, no weakness and no weight loss. There are no hypoglycemic complications. There are no diabetic complications. Pertinent negatives for diabetic complications include no CVA, PVD or retinopathy. His weight is stable. He is following a generally unhealthy diet. He has not had a previous visit with a dietitian. There is no change in his home blood glucose trend. He does not see a podiatrist.Eye exam is not current.        The following portions of the patient's history were  reviewed and updated as appropriate: allergies, current medications, past social history and problem list.    Review of Systems   Constitutional: Positive for diaphoresis and fatigue. Negative for activity change, appetite change, chills, fever, malaise/fatigue, unexpected weight change and weight loss.   HENT: Negative for congestion, dental problem, drooling, ear discharge, ear pain, facial swelling, hearing loss, mouth sores, nosebleeds, postnasal drip, rhinorrhea, sinus pressure, sinus pain, sneezing, sore throat, tinnitus, trouble swallowing and voice change.    Eyes: Negative.  Negative for blurred vision, photophobia, pain, discharge, redness, itching and visual disturbance.   Respiratory: Negative.  Negative for apnea, cough, choking, chest tightness, shortness of breath, wheezing and stridor.    Cardiovascular: Negative.  Negative for chest pain, palpitations, leg swelling and PND.   Gastrointestinal: Positive for abdominal distention, abdominal pain, diarrhea, nausea and vomiting. Negative for anal bleeding, bloating, blood in stool, constipation, flatus and rectal pain.   Endocrine: Negative.  Negative for cold intolerance, heat intolerance, polydipsia, polyphagia and polyuria.   Genitourinary: Negative.  Negative for difficulty urinating, dysuria, enuresis and flank pain.   Musculoskeletal: Positive for arthralgias, back pain, gait problem, joint swelling and myalgias. Negative for neck pain and neck stiffness.   Skin: Negative for color change, pallor, rash and wound.   Allergic/Immunologic: Negative for environmental allergies, food allergies and immunocompromised state.   Neurological: Positive for dizziness. Negative for vertigo, tremors, seizures, syncope, facial asymmetry, speech difficulty, weakness, light-headedness and headaches.   Hematological: Negative.  Negative for adenopathy. Does not bruise/bleed easily.   Psychiatric/Behavioral: Positive for agitation. Negative for behavioral problems,  "confusion, decreased concentration, dysphoric mood, hallucinations, self-injury, sleep disturbance and suicidal ideas. The patient is nervous/anxious. The patient is not hyperactive.        Objective   /80   Pulse 100   Ht 175.3 cm (69\")   Wt 99.3 kg (219 lb)   SpO2 99%   BMI 32.34 kg/m²   Physical Exam  Vitals and nursing note reviewed.   Constitutional:       General: He is not in acute distress.     Appearance: Normal appearance. He is normal weight. He is not ill-appearing, toxic-appearing or diaphoretic.   HENT:      Head: Normocephalic and atraumatic.      Comments: Jaundice sclera      Right Ear: Tympanic membrane normal. There is no impacted cerumen.      Left Ear: Tympanic membrane normal. There is no impacted cerumen.      Nose: Nose normal. No congestion or rhinorrhea.      Mouth/Throat:      Mouth: Mucous membranes are dry.      Pharynx: No oropharyngeal exudate or posterior oropharyngeal erythema.   Eyes:      General: No visual field deficit or scleral icterus.        Right eye: No discharge.         Left eye: No discharge.      Extraocular Movements: Extraocular movements intact.      Pupils: Pupils are equal, round, and reactive to light.   Neck:      Vascular: No carotid bruit.   Cardiovascular:      Rate and Rhythm: Normal rate and regular rhythm.      Pulses:           Dorsalis pedis pulses are 3+ on the right side and 3+ on the left side.        Posterior tibial pulses are 3+ on the right side and 3+ on the left side.      Heart sounds: Normal heart sounds. No murmur heard.    No friction rub. No gallop.   Pulmonary:      Effort: Pulmonary effort is normal. No respiratory distress.      Breath sounds: No stridor. No wheezing, rhonchi or rales.   Chest:      Chest wall: No tenderness.   Abdominal:      General: Abdomen is flat. There is no distension.      Palpations: Abdomen is soft. There is no mass.      Tenderness: There is abdominal tenderness. There is no right CVA tenderness, " left CVA tenderness, guarding or rebound.      Hernia: No hernia is present.      Comments: Diffuse abdominal tenderness noted    Musculoskeletal:         General: No swelling, tenderness, deformity or signs of injury. Normal range of motion.      Cervical back: Normal range of motion. No rigidity or tenderness.      Right lower leg: No edema.      Left lower leg: No edema.      Right foot: Normal range of motion. No deformity or bunion.      Left foot: Normal range of motion. No deformity or bunion.   Feet:      Right foot:      Protective Sensation: 6 sites tested. 6 sites sensed.      Skin integrity: Skin integrity normal. No ulcer, blister or skin breakdown.      Left foot:      Protective Sensation: 6 sites tested. 6 sites sensed.      Skin integrity: Skin integrity normal. No ulcer, blister or skin breakdown.      Toenail Condition: Left toenails are normal.   Lymphadenopathy:      Cervical: No cervical adenopathy.   Skin:     General: Skin is warm.      Capillary Refill: Capillary refill takes less than 2 seconds.      Coloration: Skin is not jaundiced or pale.      Findings: No bruising, erythema, lesion or rash.   Neurological:      General: No focal deficit present.      Mental Status: He is alert and oriented to person, place, and time.      Cranial Nerves: No cranial nerve deficit or dysarthria.      Sensory: No sensory deficit.      Motor: No weakness, tremor, atrophy, abnormal muscle tone, seizure activity or pronator drift.      Coordination: Romberg sign negative. Coordination normal. Finger-Nose-Finger Test and Heel to Shin Test normal. Rapid alternating movements normal.      Gait: Gait and tandem walk normal.      Deep Tendon Reflexes: Reflexes normal.   Psychiatric:         Mood and Affect: Mood normal.         Behavior: Behavior normal.              Assessment & Plan     Problems Addressed this Visit    None     Visit Diagnoses     Diarrhea, unspecified type    -  Primary    Relevant Orders     CBC & Differential    Hemoglobin A1c    Iron    Lipid Panel    Vitamin D 25 Hydroxy    Vitamin B12    TSH    Dizziness        Relevant Orders    CBC & Differential    Hemoglobin A1c    Iron    Lipid Panel    Vitamin D 25 Hydroxy    Vitamin B12    TSH    Epigastric pain        Relevant Orders    CBC & Differential    Hemoglobin A1c    Iron    Lipid Panel    Vitamin D 25 Hydroxy    Vitamin B12    TSH    Amylase (Completed)    Lipase (Completed)    Ammonia (Completed)    Comprehensive Metabolic Panel (Completed)    Low sodium levels        Dehydration          Diagnoses       Codes Comments    Diarrhea, unspecified type    -  Primary ICD-10-CM: R19.7  ICD-9-CM: 787.91     Dizziness     ICD-10-CM: R42  ICD-9-CM: 780.4     Epigastric pain     ICD-10-CM: R10.13  ICD-9-CM: 789.06     Low sodium levels     ICD-10-CM: E87.1  ICD-9-CM: 276.1     Dehydration     ICD-10-CM: E86.0  ICD-9-CM: 276.51            New Medications Ordered This Visit   Medications   • prochlorperazine (COMPAZINE) 10 MG tablet     Sig: Take 1 tablet by mouth Every 6 (Six) Hours As Needed for Nausea or Vomiting.     Dispense:  40 tablet     Refill:  6   • promethazine (PHENERGAN) 25 MG tablet     Sig: Take 1 tablet by mouth Every 6 (Six) Hours As Needed for Nausea or Vomiting.     Dispense:  60 tablet     Refill:  11     This prescription was filled on 2/2/2022. Any refills authorized will be placed on file.     Current Outpatient Medications on File Prior to Visit   Medication Sig Dispense Refill   • Ascorbic Acid (Vitamin C) 500 MG capsule Take  by mouth Daily.     • cloNIDine (Catapres) 0.1 MG tablet Use if blood pressure is 180/90 or above bid 60 tablet 5   • Dulaglutide (Trulicity) 0.75 MG/0.5ML solution pen-injector Inject 0.75 mg under the skin into the appropriate area as directed 1 (One) Time Per Week. 4 pen 11   • metoprolol succinate XL (TOPROL-XL) 25 MG 24 hr tablet Take 1 tablet by mouth Daily. 90 tablet 3   • multivitamin with minerals  tablet tablet Take 1 tablet by mouth Daily.     • pantoprazole (PROTONIX) 20 MG EC tablet Take 1 tablet by mouth As Needed for Heartburn. 90 tablet 3   • tiZANidine (ZANAFLEX) 4 MG tablet TAKE 1 TABLET BY MOUTH EVERY 8 HOURS AS NEEDED FOR MUSCLE SPASMS 90 tablet 5   • valsartan-hydrochlorothiazide (DIOVAN-HCT) 320-12.5 MG per tablet TAKE 1 TABLET BY MOUTH once daily 30 tablet 11   • Zinc 30 MG capsule Take  by mouth Daily.     • [DISCONTINUED] promethazine (PHENERGAN) 25 MG tablet Take 1 tablet by mouth Every 6 (Six) Hours As Needed for Nausea or Vomiting. 30 tablet 2   • [DISCONTINUED] busPIRone (BUSPAR) 10 MG tablet Take 1 tablet by mouth 3 (Three) Times a Day As Needed (tid prn). 90 tablet 3   • [DISCONTINUED] gabapentin (Neurontin) 300 MG capsule Take 1 capsule by mouth 3 (Three) Times a Day. 90 capsule 2   • [DISCONTINUED] meloxicam (MOBIC) 15 MG tablet Take 1 tablet by mouth Daily. Take once daily. 90 tablet 3     No current facility-administered medications on file prior to visit.       15 minutes   Follow Up   No follow-ups on file.        It's not just what you eat, but when you eat  Eat breakfast, and eat smaller meals throughout the day. A healthy breakfast can jumpstart your metabolism, while eating small, healthy meals (rather than the standard three large meals) keeps your energy up.   Avoid eating at night. Try to eat dinner earlier and fast for 14-16 hours until breakfast the next morning. Studies suggest that eating only when you’re most active and giving your digestive system a long break each day may help to regulate weight.     Fluids, rest -gatorade over the weekend -ER tomorrow if worsen -patient agrees -spoke with girlfriend and she agrees with plan of action   Notified sodium is the biggest concern   Labs to Dr Robledo due to history

## 2022-08-06 LAB
25(OH)D3 SERPL-MCNC: 40.5 NG/ML (ref 30–100)
CHOLEST SERPL-MCNC: 248 MG/DL (ref 0–200)
HBA1C MFR BLD: 8.3 % (ref 4.8–5.6)
HDLC SERPL-MCNC: 56 MG/DL (ref 40–60)
IRON 24H UR-MRATE: 125 MCG/DL (ref 59–158)
LDLC SERPL CALC-MCNC: 153 MG/DL (ref 0–100)
LDLC/HDLC SERPL: 2.65 {RATIO}
TRIGL SERPL-MCNC: 217 MG/DL (ref 0–150)
TSH SERPL DL<=0.05 MIU/L-ACNC: 2.04 UIU/ML (ref 0.27–4.2)
VIT B12 BLD-MCNC: 1515 PG/ML (ref 211–946)
VLDLC SERPL-MCNC: 39 MG/DL (ref 5–40)

## 2022-08-08 ENCOUNTER — TELEPHONE (OUTPATIENT)
Dept: FAMILY MEDICINE CLINIC | Facility: CLINIC | Age: 47
End: 2022-08-08

## 2022-08-08 NOTE — TELEPHONE ENCOUNTER
Per LISBETH Leroy, Mr. Mims has been called with recent lab results & recommendations.  Continue current medications and follow-up as planned or sooner if any problems.     He is wondering if the Trulicity is causing problems with labs or pancreatitis.   He states he has not been drinking that much and states he didn't drink last week.  He said he is not feeling much better at all. Maybe just a little better but he is not back to normal by no means.     Labs faxed    ----- Message from LISBETH De La Rosa sent at 8/8/2022  7:53 AM CDT -----  Can you let him know the rest of his labs show sugar is back elevated again and I am  sure its from drinking --see if he is feeling better -also needs results faxed to Dr Robledo's office.

## 2022-08-08 NOTE — PROGRESS NOTES
Per LISBETH Leroy, Mr. Mims has been called with recent lab results & recommendations.  Continue current medications and follow-up as planned or sooner if any problems.     He is wondering if the Trulicity is causing problems with labs or pancreatitis.   He states he has not been drinking that much and states he didn't drink last week.  He said he is not feeling much better at all. Maybe just a little better but he is not back to normal by no means.

## 2022-08-09 ENCOUNTER — TELEPHONE (OUTPATIENT)
Dept: FAMILY MEDICINE CLINIC | Facility: CLINIC | Age: 47
End: 2022-08-09

## 2022-08-09 NOTE — TELEPHONE ENCOUNTER
Per LISBETH Leroy, Mr. Mims  has been called with recommendations.  Continue current medications and follow-up as planned or sooner if any problems.     He said No he does not wish to see Endo or Nephrology at this time.   He said it was just while he was on vacation that he slipped.       ----- Message from LISBETH De La Rosa sent at 8/8/2022  4:02 PM CDT -----  He just needs to stop drinking all together. His girlfriend told us a different story with the alcohol-the next step is insulin -see if he wants to ahead and see endocrinology and a kidney doctor.   ----- Message -----  From: Dina Guaman LPN  Sent: 8/8/2022   3:57 PM CDT  To: LISBETH De La Rosa    Per LISBETH Leroy, Mr. Mims has been called with recent lab results & recommendations.  Continue current medications and follow-up as planned or sooner if any problems.     He is wondering if the Trulicity is causing problems with labs or pancreatitis.   He states he has not been drinking that much and states he didn't drink last week.  He said he is not feeling much better at all. Maybe just a little better but he is not back to normal by no means.

## 2022-08-09 NOTE — TELEPHONE ENCOUNTER
Mr. Mims said he talked with Dr. Robledo and he told him things were good except for his kidney function studies.     Mr. Mims was wondering if the Trulicity does may be to high for him at this time?    He said he likes the Trulicity because it helps keep things in check.     He is asking if maybe his does was increase to quickly.     He really does not want to do insulin, he said once your on it, your on it, no going back.     I did tell him that if it (insulin) gets his blood sugars under better control and it helps keep his organs from damage, insulin would be worth it.   I did tell him that over time uncontrolled blood sugars can lead to eye damage and possible blindness, kidney damage with possible dialysis and it can cause venous insuffiencey causing the loss of toes or a foot or possibly a leg.      He said he understood and wanted me to ask about the Trulicity does

## 2022-08-09 NOTE — PROGRESS NOTES
Per LISBETH Leroy, Mr. Mims  has been called with recommendations.  Continue current medications and follow-up as planned or sooner if any problems.     He said No he does not wish to see Endo or Nephrology at this time.   He said it was just while he was on vacation that he slipped.

## 2022-08-10 DIAGNOSIS — E11.49 TYPE 2 DIABETES MELLITUS WITH OTHER NEUROLOGIC COMPLICATION, WITHOUT LONG-TERM CURRENT USE OF INSULIN: Primary | ICD-10-CM

## 2022-08-10 RX ORDER — DULAGLUTIDE 0.75 MG/.5ML
0.75 INJECTION, SOLUTION SUBCUTANEOUS WEEKLY
Qty: 4 PEN | Refills: 11 | Status: SHIPPED | OUTPATIENT
Start: 2022-08-10

## 2022-08-25 ENCOUNTER — LAB (OUTPATIENT)
Dept: LAB | Facility: HOSPITAL | Age: 47
End: 2022-08-25

## 2022-08-25 ENCOUNTER — OFFICE VISIT (OUTPATIENT)
Dept: FAMILY MEDICINE CLINIC | Facility: CLINIC | Age: 47
End: 2022-08-25

## 2022-08-25 VITALS
SYSTOLIC BLOOD PRESSURE: 120 MMHG | BODY MASS INDEX: 33.33 KG/M2 | OXYGEN SATURATION: 97 % | DIASTOLIC BLOOD PRESSURE: 88 MMHG | HEIGHT: 69 IN | WEIGHT: 225 LBS | HEART RATE: 97 BPM

## 2022-08-25 DIAGNOSIS — R53.83 MALAISE AND FATIGUE: ICD-10-CM

## 2022-08-25 DIAGNOSIS — R53.81 MALAISE AND FATIGUE: ICD-10-CM

## 2022-08-25 DIAGNOSIS — T24.291A SECOND DEGREE BURN OF MULTIPLE SITES OF RIGHT LOWER EXTREMITY EXCEPT ANKLE AND FOOT, INITIAL ENCOUNTER: Primary | ICD-10-CM

## 2022-08-25 LAB
ALBUMIN SERPL-MCNC: 4.6 G/DL (ref 3.5–5.2)
ALBUMIN/GLOB SERPL: 1.4 G/DL
ALP SERPL-CCNC: 108 U/L (ref 39–117)
ALT SERPL W P-5'-P-CCNC: 31 U/L (ref 1–41)
ANION GAP SERPL CALCULATED.3IONS-SCNC: 9 MMOL/L (ref 5–15)
AST SERPL-CCNC: 45 U/L (ref 1–40)
BILIRUB SERPL-MCNC: 0.7 MG/DL (ref 0–1.2)
BUN SERPL-MCNC: 16 MG/DL (ref 6–20)
BUN/CREAT SERPL: 15 (ref 7–25)
CALCIUM SPEC-SCNC: 9.8 MG/DL (ref 8.6–10.5)
CHLORIDE SERPL-SCNC: 98 MMOL/L (ref 98–107)
CO2 SERPL-SCNC: 29 MMOL/L (ref 22–29)
CREAT SERPL-MCNC: 1.07 MG/DL (ref 0.76–1.27)
EGFRCR SERPLBLD CKD-EPI 2021: 85.6 ML/MIN/1.73
GLOBULIN UR ELPH-MCNC: 3.4 GM/DL
GLUCOSE SERPL-MCNC: 175 MG/DL (ref 65–99)
POTASSIUM SERPL-SCNC: 4.3 MMOL/L (ref 3.5–5.2)
PROT SERPL-MCNC: 8 G/DL (ref 6–8.5)
SODIUM SERPL-SCNC: 136 MMOL/L (ref 136–145)

## 2022-08-25 PROCEDURE — 99213 OFFICE O/P EST LOW 20 MIN: CPT | Performed by: NURSE PRACTITIONER

## 2022-08-25 PROCEDURE — 83036 HEMOGLOBIN GLYCOSYLATED A1C: CPT | Performed by: NURSE PRACTITIONER

## 2022-08-25 PROCEDURE — 85025 COMPLETE CBC W/AUTO DIFF WBC: CPT | Performed by: NURSE PRACTITIONER

## 2022-08-25 PROCEDURE — 80053 COMPREHEN METABOLIC PANEL: CPT | Performed by: NURSE PRACTITIONER

## 2022-08-25 PROCEDURE — 36415 COLL VENOUS BLD VENIPUNCTURE: CPT | Performed by: NURSE PRACTITIONER

## 2022-08-25 RX ORDER — CEPHALEXIN 500 MG/1
500 CAPSULE ORAL 3 TIMES DAILY
Qty: 30 CAPSULE | Refills: 0 | Status: SHIPPED | OUTPATIENT
Start: 2022-08-25 | End: 2023-01-19
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
## 2022-08-26 ENCOUNTER — TELEPHONE (OUTPATIENT)
Dept: FAMILY MEDICINE CLINIC | Facility: CLINIC | Age: 47
End: 2022-08-26

## 2022-08-26 LAB
BASOPHILS # BLD AUTO: 0.04 10*3/MM3 (ref 0–0.2)
BASOPHILS NFR BLD AUTO: 0.7 % (ref 0–1.5)
DEPRECATED RDW RBC AUTO: 48 FL (ref 37–54)
EOSINOPHIL # BLD AUTO: 0.13 10*3/MM3 (ref 0–0.4)
EOSINOPHIL NFR BLD AUTO: 2.3 % (ref 0.3–6.2)
ERYTHROCYTE [DISTWIDTH] IN BLOOD BY AUTOMATED COUNT: 14.2 % (ref 12.3–15.4)
HBA1C MFR BLD: 8.5 % (ref 4.8–5.6)
HCT VFR BLD AUTO: 36.3 % (ref 37.5–51)
HGB BLD-MCNC: 12.5 G/DL (ref 13–17.7)
IMM GRANULOCYTES # BLD AUTO: 0.02 10*3/MM3 (ref 0–0.05)
IMM GRANULOCYTES NFR BLD AUTO: 0.4 % (ref 0–0.5)
LYMPHOCYTES # BLD AUTO: 1.77 10*3/MM3 (ref 0.7–3.1)
LYMPHOCYTES NFR BLD AUTO: 31 % (ref 19.6–45.3)
MCH RBC QN AUTO: 32.6 PG (ref 26.6–33)
MCHC RBC AUTO-ENTMCNC: 34.4 G/DL (ref 31.5–35.7)
MCV RBC AUTO: 94.5 FL (ref 79–97)
MONOCYTES # BLD AUTO: 0.62 10*3/MM3 (ref 0.1–0.9)
MONOCYTES NFR BLD AUTO: 10.9 % (ref 5–12)
NEUTROPHILS NFR BLD AUTO: 3.13 10*3/MM3 (ref 1.7–7)
NEUTROPHILS NFR BLD AUTO: 54.7 % (ref 42.7–76)
NRBC BLD AUTO-RTO: 0 /100 WBC (ref 0–0.2)
PLATELET # BLD AUTO: 256 10*3/MM3 (ref 140–450)
PMV BLD AUTO: 9.7 FL (ref 6–12)
RBC # BLD AUTO: 3.84 10*6/MM3 (ref 4.14–5.8)
WBC NRBC COR # BLD: 5.71 10*3/MM3 (ref 3.4–10.8)

## 2022-08-26 NOTE — PROGRESS NOTES
Per LISBETH Leroy, Mr. Mims has been called with recent lab results & recommendations.  Continue current medications and follow-up as planned or sooner if any problems. 3  Results faxed to Dr Robledo.

## 2022-08-26 NOTE — TELEPHONE ENCOUNTER
Per LISBETH Leroy, Mr. Mims has been called with recent lab results & recommendations.  Continue current medications and follow-up as planned or sooner if any problems. 3  Results faxed to Dr Robledo.      ----- Message from LISBETH De La Rosa sent at 8/25/2022 10:51 AM CDT -----  Can you let him know his renal function is back to normal-its was just from drinking and dehydration the last time

## 2022-08-26 NOTE — TELEPHONE ENCOUNTER
Per LISBETH Leroy, Mr. Mims has been called with recent lab results & recommendations.  Continue current medications and follow-up as planned or sooner if any problems. 3  Results faxed to Dr Robledo.      ----- Message from LISBETH De La Rosa sent at 8/26/2022  7:56 AM CDT -----  Can you let him know renal function is good, a1c is up a little but not terrible, labs are stable for him

## 2022-10-22 DIAGNOSIS — E11.49 TYPE 2 DIABETES MELLITUS WITH OTHER NEUROLOGIC COMPLICATION, WITHOUT LONG-TERM CURRENT USE OF INSULIN: ICD-10-CM

## 2022-10-22 DIAGNOSIS — G25.81 RESTLESS LEG SYNDROME: ICD-10-CM

## 2022-10-22 DIAGNOSIS — I10 ESSENTIAL HYPERTENSION: ICD-10-CM

## 2022-10-22 DIAGNOSIS — R79.89 LOW TESTOSTERONE IN MALE: ICD-10-CM

## 2022-10-22 DIAGNOSIS — E11.49 OTHER DIABETIC NEUROLOGICAL COMPLICATION ASSOCIATED WITH TYPE 2 DIABETES MELLITUS: ICD-10-CM

## 2022-10-24 RX ORDER — PANTOPRAZOLE SODIUM 20 MG/1
TABLET, DELAYED RELEASE ORAL
Qty: 90 TABLET | Refills: 3 | Status: SHIPPED | OUTPATIENT
Start: 2022-10-24

## 2023-01-16 DIAGNOSIS — R79.89 LOW TESTOSTERONE IN MALE: ICD-10-CM

## 2023-01-16 DIAGNOSIS — E11.49 TYPE 2 DIABETES MELLITUS WITH OTHER NEUROLOGIC COMPLICATION, WITHOUT LONG-TERM CURRENT USE OF INSULIN: ICD-10-CM

## 2023-01-16 DIAGNOSIS — E11.49 OTHER DIABETIC NEUROLOGICAL COMPLICATION ASSOCIATED WITH TYPE 2 DIABETES MELLITUS: ICD-10-CM

## 2023-01-16 DIAGNOSIS — I10 ESSENTIAL HYPERTENSION: ICD-10-CM

## 2023-01-16 DIAGNOSIS — G25.81 RESTLESS LEG SYNDROME: ICD-10-CM

## 2023-01-16 RX ORDER — METOPROLOL SUCCINATE 25 MG/1
TABLET, EXTENDED RELEASE ORAL
Qty: 90 TABLET | Refills: 3 | Status: SHIPPED | OUTPATIENT
Start: 2023-01-16

## 2023-01-18 ENCOUNTER — TELEPHONE (OUTPATIENT)
Dept: FAMILY MEDICINE CLINIC | Facility: CLINIC | Age: 48
End: 2023-01-18
Payer: COMMERCIAL

## 2023-01-18 NOTE — TELEPHONE ENCOUNTER
Pt says he has a question for Yvonne and said it was about his medicine and just asks that Yvonne call him

## 2023-01-19 ENCOUNTER — OFFICE VISIT (OUTPATIENT)
Dept: FAMILY MEDICINE CLINIC | Facility: CLINIC | Age: 48
End: 2023-01-19
Payer: COMMERCIAL

## 2023-01-19 VITALS
TEMPERATURE: 98.1 F | HEIGHT: 69 IN | RESPIRATION RATE: 18 BRPM | SYSTOLIC BLOOD PRESSURE: 138 MMHG | DIASTOLIC BLOOD PRESSURE: 86 MMHG | BODY MASS INDEX: 33.62 KG/M2 | WEIGHT: 227 LBS | HEART RATE: 115 BPM | OXYGEN SATURATION: 97 %

## 2023-01-19 DIAGNOSIS — F41.9 ANXIETY: ICD-10-CM

## 2023-01-19 DIAGNOSIS — J01.10 ACUTE NON-RECURRENT FRONTAL SINUSITIS: Primary | ICD-10-CM

## 2023-01-19 DIAGNOSIS — G47.00 INSOMNIA, UNSPECIFIED TYPE: ICD-10-CM

## 2023-01-19 LAB
EXPIRATION DATE: 0
EXPIRATION DATE: 0
FLUAV AG NPH QL: NEGATIVE
FLUBV AG NPH QL: NEGATIVE
INTERNAL CONTROL: NORMAL
INTERNAL CONTROL: NORMAL
Lab: 0
Lab: 0
SARS-COV-2 AG UPPER RESP QL IA.RAPID: NOT DETECTED

## 2023-01-19 PROCEDURE — 87426 SARSCOV CORONAVIRUS AG IA: CPT | Performed by: NURSE PRACTITIONER

## 2023-01-19 PROCEDURE — 99214 OFFICE O/P EST MOD 30 MIN: CPT | Performed by: NURSE PRACTITIONER

## 2023-01-19 PROCEDURE — 87804 INFLUENZA ASSAY W/OPTIC: CPT | Performed by: NURSE PRACTITIONER

## 2023-01-19 RX ORDER — HYDROXYZINE HYDROCHLORIDE 25 MG/1
25 TABLET, FILM COATED ORAL 3 TIMES DAILY PRN
Qty: 90 TABLET | Refills: 1 | Status: SHIPPED | OUTPATIENT
Start: 2023-01-19

## 2023-01-19 RX ORDER — UREA 10 %
27 LOTION (ML) TOPICAL 2 TIMES DAILY
COMMUNITY

## 2023-01-19 RX ORDER — SERTRALINE HYDROCHLORIDE 25 MG/1
25 TABLET, FILM COATED ORAL DAILY
Qty: 30 TABLET | Refills: 11 | Status: SHIPPED | OUTPATIENT
Start: 2023-01-19

## 2023-01-19 RX ORDER — METHYLPREDNISOLONE 4 MG/1
TABLET ORAL
Qty: 21 EACH | Refills: 0 | Status: SHIPPED | OUTPATIENT
Start: 2023-01-19

## 2023-01-19 RX ORDER — LORATADINE 10 MG/1
10 TABLET ORAL DAILY
Qty: 30 TABLET | Refills: 3 | Status: SHIPPED | OUTPATIENT
Start: 2023-01-19

## 2023-01-19 NOTE — PROGRESS NOTES
Chief Complaint  Anxiety (Feeling sick 12-16-22 off and on has been sick./), Headache, Sinusitis, Diarrhea, Nasal Congestion, and Shortness of Breath    Subjective          Noman Mims presents to [facility] [place] with sinus congestion, watery eyes and cough that has been going on for about 3 weeks. He is having issues with sleep as well as anxiety, he doesn't feel like his current medication is working well.  He has used hydroxyzine in the past and this has helped him rest and allergies.      Sinusitis  This is a new problem. The current episode started 1 to 4 weeks ago. The problem has been waxing and waning since onset. There has been no fever. The pain is mild. Associated symptoms include congestion, coughing and sinus pressure. Past treatments include antibiotics. The treatment provided no relief.   Insomnia  This is a recurrent problem. The current episode started more than 1 month ago. The problem occurs daily. The problem has been waxing and waning. Associated symptoms include congestion and coughing. The symptoms are aggravated by coughing. He has tried rest, relaxation and sleep for the symptoms. The treatment provided no relief.   Anxiety  Presents for initial visit. Onset was at an unknown time. The problem has been gradually worsening. Symptoms include excessive worry, insomnia and nervous/anxious behavior. Symptoms occur most days. The severity of symptoms is causing significant distress. Nothing aggravates the symptoms.     His past medical history is significant for anxiety/panic attacks. Past treatments include lifestyle changes and non-benzodiazephine anxiolytics. The treatment provided no relief. Compliance with prior treatments has been variable.     Outpatient Medications Prior to Visit   Medication Sig Dispense Refill   • Ascorbic Acid (Vitamin C) 500 MG capsule Take  by mouth Daily.     • cloNIDine (Catapres) 0.1 MG tablet Use if blood  "pressure is 180/90 or above bid 60 tablet 5   • Dulaglutide (Trulicity) 0.75 MG/0.5ML solution pen-injector Inject 0.75 mg under the skin into the appropriate area as directed 1 (One) Time Per Week. 4 pen 11   • magnesium, as, gluconate (MAGONATE) 500 (27 Mg) MG tablet Take 27 mg by mouth 2 (Two) Times a Day.     • metoprolol succinate XL (TOPROL-XL) 25 MG 24 hr tablet TAKE 1 TABLET BY MOUTH once daily 90 tablet 3   • multivitamin with minerals tablet tablet Take 1 tablet by mouth Daily.     • pantoprazole (PROTONIX) 20 MG EC tablet TAKE 1 TABLET BY MOUTH once daily AS NEEDED for heartburn 90 tablet 3   • promethazine (PHENERGAN) 25 MG tablet Take 1 tablet by mouth Every 6 (Six) Hours As Needed for Nausea or Vomiting. 60 tablet 11   • silver sulfadiazine (Silvadene) 1 % cream Apply 1 application topically to the appropriate area as directed 2 (Two) Times a Day. 400 g 11   • tiZANidine (ZANAFLEX) 4 MG tablet TAKE 1 TABLET BY MOUTH EVERY 8 HOURS AS NEEDED FOR MUSCLE SPASMS 90 tablet 5   • valsartan-hydrochlorothiazide (DIOVAN-HCT) 320-12.5 MG per tablet TAKE 1 TABLET BY MOUTH once daily 30 tablet 11   • Zinc 30 MG capsule Take  by mouth Daily.     • prochlorperazine (COMPAZINE) 10 MG tablet Take 1 tablet by mouth Every 6 (Six) Hours As Needed for Nausea or Vomiting. 40 tablet 6   • cephalexin (Keflex) 500 MG capsule Take 1 capsule by mouth 3 (Three) Times a Day. 30 capsule 0     No facility-administered medications prior to visit.       Review of Systems   HENT: Positive for congestion and sinus pressure.    Respiratory: Positive for cough.    Psychiatric/Behavioral: The patient is nervous/anxious and has insomnia.          Objective   Vital Signs:   Visit Vitals  /86   Pulse 115   Temp 98.1 °F (36.7 °C) (Infrared)   Resp 18   Ht 175.3 cm (69\")   Wt 103 kg (227 lb)   SpO2 97%   BMI 33.52 kg/m²     Physical Exam  Vitals and nursing note reviewed.   Constitutional:       Appearance: Normal appearance. He is " well-developed.   HENT:      Head: Normocephalic and atraumatic.      Right Ear: Hearing and tympanic membrane normal.      Left Ear: Hearing and tympanic membrane normal.      Nose: Congestion present. No mucosal edema or rhinorrhea.      Right Sinus: No maxillary sinus tenderness or frontal sinus tenderness.      Left Sinus: No maxillary sinus tenderness or frontal sinus tenderness.   Eyes:      General: Lids are normal.      Conjunctiva/sclera: Conjunctivae normal.      Pupils: Pupils are equal, round, and reactive to light.   Neck:      Thyroid: No thyroid mass or thyromegaly.      Trachea: Trachea normal. No tracheal tenderness or tracheal deviation.   Cardiovascular:      Rate and Rhythm: Tachycardia present.      Pulses: Normal pulses.      Heart sounds: Normal heart sounds.   Pulmonary:      Effort: Pulmonary effort is normal. No respiratory distress.      Breath sounds: Normal breath sounds. No stridor. No wheezing or rales.   Abdominal:      Palpations: Abdomen is soft.   Musculoskeletal:         General: Normal range of motion.      Cervical back: Normal range of motion.   Skin:     General: Skin is warm and dry.   Neurological:      Mental Status: He is alert and oriented to person, place, and time.   Psychiatric:         Mood and Affect: Mood is anxious. Affect is not inappropriate.         Speech: Speech normal.         Behavior: Behavior normal. Behavior is not agitated or hyperactive.         Thought Content: Thought content normal.         Judgment: Judgment normal.        Result Review :                 Assessment and Plan    Diagnoses and all orders for this visit:    1. Acute non-recurrent frontal sinusitis (Primary)  -     hydrOXYzine (ATARAX) 25 MG tablet; Take 1 tablet by mouth 3 (Three) Times a Day As Needed for Allergies or Anxiety.  Dispense: 90 tablet; Refill: 1  -     loratadine (Claritin) 10 MG tablet; Take 1 tablet by mouth Daily.  Dispense: 30 tablet; Refill: 3  -      methylPREDNISolone (MEDROL) 4 MG dose pack; Take as directed on package instructions.  Dispense: 21 each; Refill: 0  -     POCT Influenza A/B  -     POCT SARS-CoV-2 Antigen VIVEK    2. Insomnia, unspecified type  -     hydrOXYzine (ATARAX) 25 MG tablet; Take 1 tablet by mouth 3 (Three) Times a Day As Needed for Allergies or Anxiety.  Dispense: 90 tablet; Refill: 1    3. Anxiety  -     hydrOXYzine (ATARAX) 25 MG tablet; Take 1 tablet by mouth 3 (Three) Times a Day As Needed for Allergies or Anxiety.  Dispense: 90 tablet; Refill: 1  -     sertraline (Zoloft) 25 MG tablet; Take 1 tablet by mouth Daily.  Dispense: 30 tablet; Refill: 11         Start prescribed medications for sinus issues  Continue to use Flonase  Increase hydration    Discussed Zoloft, he would like to try this  If you have any thoughts of harming yourself or others while taking this medication please stop medication and go to ER    Please follow-up with primary care in 1 month    If symptoms persist or worsen please call the office      Follow Up   Return in about 4 weeks (around 2/16/2023), or if symptoms worsen or fail to improve, for Recheck.  Patient was given instructions and counseling regarding his condition or for health maintenance advice. Please see specific information pulled into the AVS if appropriate.           This document has been electronically signed by LISBETH Alcala on January 19, 2023 13:29 CST

## 2023-01-19 NOTE — LETTER
January 19, 2023     Patient: Noman Mims   YOB: 1975   Date of Visit: 1/19/2023       To Whom It May Concern:    It is my medical opinion that Noman Mims may return to work on 1/23/2023.           Sincerely,          This document has been electronically signed by LISBETH Alcala on January 19, 2023 11:01 CST      LISBETH Alcala    CC:   No Recipients

## 2023-01-23 ENCOUNTER — TELEPHONE (OUTPATIENT)
Dept: FAMILY MEDICINE CLINIC | Facility: CLINIC | Age: 48
End: 2023-01-23
Payer: COMMERCIAL

## 2023-01-23 DIAGNOSIS — R05.1 ACUTE COUGH: Primary | ICD-10-CM

## 2023-01-23 DIAGNOSIS — J01.10 ACUTE NON-RECURRENT FRONTAL SINUSITIS: ICD-10-CM

## 2023-01-23 RX ORDER — AZITHROMYCIN 250 MG/1
TABLET, FILM COATED ORAL
Qty: 6 TABLET | Refills: 0 | Status: SHIPPED | OUTPATIENT
Start: 2023-01-23

## 2023-01-23 NOTE — TELEPHONE ENCOUNTER
Patient called stating he seen Suzanne on 1/19 and she told him if he was not better in a few days to call back. Patient said he is still coughing and he has a rattle in his chest. Patient was wanting to know what Suzanne recommends. Please advise  [phone]    [facility] [place]

## 2023-01-24 ENCOUNTER — TELEPHONE (OUTPATIENT)
Dept: FAMILY MEDICINE CLINIC | Facility: CLINIC | Age: 48
End: 2023-01-24

## 2023-02-20 RX ORDER — TIZANIDINE 4 MG/1
TABLET ORAL
Qty: 90 TABLET | Refills: 5 | Status: SHIPPED | OUTPATIENT
Start: 2023-02-20

## 2023-04-12 ENCOUNTER — OFFICE VISIT (OUTPATIENT)
Dept: FAMILY MEDICINE CLINIC | Facility: CLINIC | Age: 48
End: 2023-04-12
Payer: MEDICAID

## 2023-04-12 VITALS
OXYGEN SATURATION: 95 % | BODY MASS INDEX: 33.33 KG/M2 | DIASTOLIC BLOOD PRESSURE: 88 MMHG | WEIGHT: 225 LBS | HEART RATE: 74 BPM | HEIGHT: 69 IN | SYSTOLIC BLOOD PRESSURE: 120 MMHG

## 2023-04-12 DIAGNOSIS — M79.10 MUSCLE PAIN: ICD-10-CM

## 2023-04-12 DIAGNOSIS — R00.2 PALPITATIONS: ICD-10-CM

## 2023-04-12 DIAGNOSIS — F41.9 ANXIETY: Primary | ICD-10-CM

## 2023-04-12 PROCEDURE — 1160F RVW MEDS BY RX/DR IN RCRD: CPT | Performed by: NURSE PRACTITIONER

## 2023-04-12 PROCEDURE — 3079F DIAST BP 80-89 MM HG: CPT | Performed by: NURSE PRACTITIONER

## 2023-04-12 PROCEDURE — 1159F MED LIST DOCD IN RCRD: CPT | Performed by: NURSE PRACTITIONER

## 2023-04-12 PROCEDURE — 3074F SYST BP LT 130 MM HG: CPT | Performed by: NURSE PRACTITIONER

## 2023-04-12 PROCEDURE — 99214 OFFICE O/P EST MOD 30 MIN: CPT | Performed by: NURSE PRACTITIONER

## 2023-04-12 RX ORDER — TIZANIDINE HYDROCHLORIDE 6 MG/1
6 CAPSULE, GELATIN COATED ORAL 3 TIMES DAILY
Qty: 90 CAPSULE | Refills: 3 | Status: SHIPPED | OUTPATIENT
Start: 2023-04-12

## 2023-04-12 RX ORDER — BUSPIRONE HYDROCHLORIDE 10 MG/1
10 TABLET ORAL 3 TIMES DAILY PRN
Qty: 90 TABLET | Refills: 3 | Status: SHIPPED | OUTPATIENT
Start: 2023-04-12

## 2023-04-12 NOTE — PROGRESS NOTES
Chief Complaint   Patient presents with   • Anxiety     Medications not working     Subjective   Noman Mims is a 47 y.o. male.           Anxiety  Presents for follow-up visit. Symptoms include excessive worry, irritability, nervous/anxious behavior and palpitations. Patient reports no chest pain, confusion, decreased concentration, dizziness, hyperventilation, impotence, insomnia, nausea, shortness of breath or suicidal ideas. Symptoms occur most days.     Compliance with medications is %.   Palpitations   This is a recurrent problem. The current episode started more than 1 month ago. The problem occurs intermittently. The problem has been gradually worsening. Associated symptoms include anxiety and diaphoresis. Pertinent negatives include no chest pain, coughing, dizziness, fever, nausea, numbness, shortness of breath, vomiting or weakness. The treatment provided mild relief.   Back Pain  This is a recurrent problem. The current episode started more than 1 month ago. The problem has been resolved since onset. The pain is present in the lumbar spine. The pain is at a severity of 2/10. Stiffness is present all day. Associated symptoms include paresis and paresthesias. Pertinent negatives include no abdominal pain, chest pain, dysuria, fever, headaches, numbness or weakness. He has tried analgesics, ice and NSAIDs for the symptoms. The treatment provided mild relief.        The following portions of the patient's history were reviewed and updated as appropriate: allergies, current medications, past social history and problem list.    Review of Systems   Constitutional: Positive for diaphoresis, fatigue and irritability. Negative for activity change, appetite change, chills, fever and unexpected weight change.   HENT: Negative for congestion, dental problem, drooling, ear discharge, ear pain, facial swelling, hearing loss, mouth sores, nosebleeds, postnasal drip, rhinorrhea, sinus pressure, sinus pain,  "sneezing, sore throat, tinnitus, trouble swallowing and voice change.    Eyes: Negative.  Negative for photophobia, pain, discharge, redness, itching and visual disturbance.   Respiratory: Negative.  Negative for apnea, cough, choking, chest tightness, shortness of breath, wheezing and stridor.    Cardiovascular: Positive for palpitations. Negative for chest pain and leg swelling.   Gastrointestinal: Negative for abdominal distention, abdominal pain, anal bleeding, blood in stool, constipation, diarrhea, nausea, rectal pain and vomiting.   Endocrine: Negative.  Negative for cold intolerance, heat intolerance, polydipsia, polyphagia and polyuria.   Genitourinary: Negative.  Negative for difficulty urinating, dysuria, enuresis, flank pain and impotence.   Musculoskeletal: Positive for arthralgias, back pain, gait problem, joint swelling and myalgias. Negative for neck pain and neck stiffness.   Skin: Negative for color change, pallor, rash and wound.   Allergic/Immunologic: Negative for environmental allergies, food allergies and immunocompromised state.   Neurological: Positive for paresthesias. Negative for dizziness, tremors, seizures, syncope, facial asymmetry, speech difficulty, weakness, light-headedness, numbness and headaches.   Hematological: Negative.  Negative for adenopathy. Does not bruise/bleed easily.   Psychiatric/Behavioral: Positive for agitation. Negative for behavioral problems, confusion, decreased concentration, dysphoric mood, hallucinations, self-injury, sleep disturbance and suicidal ideas. The patient is nervous/anxious. The patient does not have insomnia and is not hyperactive.        Objective   /88   Pulse 74   Ht 175.3 cm (69\")   Wt 102 kg (225 lb)   SpO2 95%   BMI 33.23 kg/m²   Physical Exam  Vitals and nursing note reviewed.   Constitutional:       General: He is not in acute distress.     Appearance: Normal appearance. He is normal weight. He is not ill-appearing, " toxic-appearing or diaphoretic.   HENT:      Head: Normocephalic and atraumatic.      Right Ear: Tympanic membrane normal. There is no impacted cerumen.      Left Ear: Tympanic membrane normal. There is no impacted cerumen.      Nose: Nose normal. No congestion or rhinorrhea.      Mouth/Throat:      Mouth: Mucous membranes are moist.      Pharynx: No oropharyngeal exudate or posterior oropharyngeal erythema.   Eyes:      General: No visual field deficit or scleral icterus.        Right eye: No discharge.         Left eye: No discharge.      Extraocular Movements: Extraocular movements intact.      Pupils: Pupils are equal, round, and reactive to light.   Neck:      Vascular: No carotid bruit.   Cardiovascular:      Rate and Rhythm: Regular rhythm. Tachycardia present.      Pulses:           Dorsalis pedis pulses are 3+ on the right side and 3+ on the left side.        Posterior tibial pulses are 3+ on the right side and 3+ on the left side.      Heart sounds: Normal heart sounds. No murmur heard.    No friction rub. No gallop.   Pulmonary:      Effort: Pulmonary effort is normal. No respiratory distress.      Breath sounds: No stridor. No wheezing, rhonchi or rales.   Chest:      Chest wall: No tenderness.   Abdominal:      General: Abdomen is flat. There is no distension.      Palpations: Abdomen is soft. There is no mass.      Tenderness: There is no abdominal tenderness. There is no right CVA tenderness, left CVA tenderness, guarding or rebound.      Hernia: No hernia is present.   Musculoskeletal:         General: Tenderness present. No swelling, deformity or signs of injury. Normal range of motion.      Cervical back: Normal range of motion and neck supple. No rigidity or tenderness.      Right lower leg: No edema.      Left lower leg: No edema.      Right foot: Normal range of motion. No deformity or bunion.      Left foot: Normal range of motion. No deformity or bunion.   Feet:      Right foot:       Protective Sensation: 6 sites tested. 6 sites sensed.      Skin integrity: Skin integrity normal. No ulcer, blister or skin breakdown.      Left foot:      Protective Sensation: 6 sites tested. 6 sites sensed.      Skin integrity: Skin integrity normal. No ulcer, blister or skin breakdown.      Toenail Condition: Left toenails are normal.   Lymphadenopathy:      Cervical: No cervical adenopathy.   Skin:     General: Skin is warm.      Capillary Refill: Capillary refill takes less than 2 seconds.      Coloration: Skin is not jaundiced or pale.      Findings: No bruising, erythema, lesion or rash.   Neurological:      General: No focal deficit present.      Mental Status: He is alert and oriented to person, place, and time.      Cranial Nerves: No cranial nerve deficit or dysarthria.      Sensory: No sensory deficit.      Motor: No weakness, tremor, atrophy, abnormal muscle tone, seizure activity or pronator drift.      Coordination: Romberg sign negative. Coordination normal. Finger-Nose-Finger Test and Heel to Shin Test normal. Rapid alternating movements normal.      Gait: Gait and tandem walk normal.      Deep Tendon Reflexes: Reflexes normal.   Psychiatric:         Mood and Affect: Mood normal.         Behavior: Behavior normal.              Assessment & Plan     Problems Addressed this Visit        Mental Health    Anxiety - Primary    Relevant Orders    Ambulatory Referral to Behavioral Health (Completed)   Other Visit Diagnoses     Palpitations        Relevant Orders    Ambulatory Referral to Cardiology    Muscle pain          Diagnoses       Codes Comments    Anxiety    -  Primary ICD-10-CM: F41.9  ICD-9-CM: 300.00     Palpitations     ICD-10-CM: R00.2  ICD-9-CM: 785.1     Muscle pain     ICD-10-CM: M79.10  ICD-9-CM: 729.1            New Medications Ordered This Visit   Medications   • sertraline (Zoloft) 50 MG tablet     Sig: Take 1 tablet by mouth Daily.     Dispense:  90 tablet     Refill:  3   • busPIRone  (BUSPAR) 10 MG tablet     Sig: Take 1 tablet by mouth 3 (Three) Times a Day As Needed (tid prn).     Dispense:  90 tablet     Refill:  3   • TiZANidine (Zanaflex) 6 MG capsule     Sig: Take 1 capsule by mouth 3 (Three) Times a Day.     Dispense:  90 capsule     Refill:  3     Current Outpatient Medications on File Prior to Visit   Medication Sig Dispense Refill   • Ascorbic Acid (Vitamin C) 500 MG capsule Take  by mouth Daily.     • cloNIDine (Catapres) 0.1 MG tablet Use if blood pressure is 180/90 or above bid 60 tablet 5   • Dulaglutide (Trulicity) 0.75 MG/0.5ML solution pen-injector Inject 0.75 mg under the skin into the appropriate area as directed 1 (One) Time Per Week. 4 pen 11   • hydrOXYzine (ATARAX) 25 MG tablet Take 1 tablet by mouth 3 (Three) Times a Day As Needed for Allergies or Anxiety. 90 tablet 1   • loratadine (Claritin) 10 MG tablet Take 1 tablet by mouth Daily. 30 tablet 3   • magnesium, as, gluconate (MAGONATE) 500 (27 Mg) MG tablet Take 1 tablet by mouth 2 (Two) Times a Day.     • metoprolol succinate XL (TOPROL-XL) 25 MG 24 hr tablet TAKE 1 TABLET BY MOUTH once daily 90 tablet 3   • multivitamin with minerals tablet tablet Take 1 tablet by mouth Daily.     • pantoprazole (PROTONIX) 20 MG EC tablet TAKE 1 TABLET BY MOUTH once daily AS NEEDED for heartburn 90 tablet 3   • prochlorperazine (COMPAZINE) 10 MG tablet Take 1 tablet by mouth Every 6 (Six) Hours As Needed for Nausea or Vomiting. 40 tablet 6   • promethazine (PHENERGAN) 25 MG tablet Take 1 tablet by mouth Every 6 (Six) Hours As Needed for Nausea or Vomiting. 60 tablet 11   • sertraline (Zoloft) 25 MG tablet Take 1 tablet by mouth Daily. 30 tablet 11   • silver sulfadiazine (Silvadene) 1 % cream Apply 1 application topically to the appropriate area as directed 2 (Two) Times a Day. 400 g 11   • tiZANidine (ZANAFLEX) 4 MG tablet TAKE 1 TABLET BY MOUTH EVERY 8 HOURS AS NEEDED FOR MUSCLE SPASMS 90 tablet 5   • valsartan-hydrochlorothiazide  (DIOVAN-HCT) 320-12.5 MG per tablet TAKE 1 TABLET BY MOUTH once daily 30 tablet 11   • Zinc 30 MG capsule Take  by mouth Daily.       No current facility-administered medications on file prior to visit.       20 minutes  Follow Up   Return in about 6 weeks (around 5/24/2023).     Increase zoloft to 50mg    refer to mental health as directed  Increase zanaflex to 6mg     Refer to cardiology       Increase zanaflex 6mg prn     See back in 6 weeks

## 2023-04-19 ENCOUNTER — TELEPHONE (OUTPATIENT)
Dept: FAMILY MEDICINE CLINIC | Facility: CLINIC | Age: 48
End: 2023-04-19
Payer: MEDICAID

## 2023-04-24 DIAGNOSIS — E11.49 TYPE 2 DIABETES MELLITUS WITH OTHER NEUROLOGIC COMPLICATION, WITHOUT LONG-TERM CURRENT USE OF INSULIN: ICD-10-CM

## 2023-04-24 DIAGNOSIS — I10 ESSENTIAL HYPERTENSION: ICD-10-CM

## 2023-04-24 RX ORDER — VALSARTAN AND HYDROCHLOROTHIAZIDE 320; 12.5 MG/1; MG/1
TABLET, FILM COATED ORAL
Qty: 30 TABLET | Refills: 5 | Status: SHIPPED | OUTPATIENT
Start: 2023-04-24

## 2023-05-18 ENCOUNTER — OFFICE VISIT (OUTPATIENT)
Dept: FAMILY MEDICINE CLINIC | Facility: CLINIC | Age: 48
End: 2023-05-18
Payer: MEDICAID

## 2023-05-18 ENCOUNTER — LAB (OUTPATIENT)
Dept: LAB | Facility: HOSPITAL | Age: 48
End: 2023-05-18
Payer: MEDICAID

## 2023-05-18 VITALS
WEIGHT: 223 LBS | BODY MASS INDEX: 33.03 KG/M2 | SYSTOLIC BLOOD PRESSURE: 98 MMHG | HEIGHT: 69 IN | DIASTOLIC BLOOD PRESSURE: 70 MMHG

## 2023-05-18 DIAGNOSIS — K21.9 GASTROESOPHAGEAL REFLUX DISEASE WITHOUT ESOPHAGITIS: ICD-10-CM

## 2023-05-18 DIAGNOSIS — R10.13 EPIGASTRIC PAIN: Primary | ICD-10-CM

## 2023-05-18 PROCEDURE — 80053 COMPREHEN METABOLIC PANEL: CPT | Performed by: NURSE PRACTITIONER

## 2023-05-18 PROCEDURE — 36415 COLL VENOUS BLD VENIPUNCTURE: CPT | Performed by: NURSE PRACTITIONER

## 2023-05-18 PROCEDURE — 3078F DIAST BP <80 MM HG: CPT | Performed by: NURSE PRACTITIONER

## 2023-05-18 PROCEDURE — 83690 ASSAY OF LIPASE: CPT | Performed by: NURSE PRACTITIONER

## 2023-05-18 PROCEDURE — 99214 OFFICE O/P EST MOD 30 MIN: CPT | Performed by: NURSE PRACTITIONER

## 2023-05-18 PROCEDURE — 1159F MED LIST DOCD IN RCRD: CPT | Performed by: NURSE PRACTITIONER

## 2023-05-18 PROCEDURE — 1160F RVW MEDS BY RX/DR IN RCRD: CPT | Performed by: NURSE PRACTITIONER

## 2023-05-18 PROCEDURE — 82150 ASSAY OF AMYLASE: CPT | Performed by: NURSE PRACTITIONER

## 2023-05-18 PROCEDURE — 3074F SYST BP LT 130 MM HG: CPT | Performed by: NURSE PRACTITIONER

## 2023-05-18 RX ORDER — DEXLANSOPRAZOLE 60 MG/1
60 CAPSULE, DELAYED RELEASE ORAL DAILY
Qty: 90 CAPSULE | Refills: 3 | Status: SHIPPED | OUTPATIENT
Start: 2023-05-18 | End: 2023-06-17

## 2023-05-18 NOTE — PROGRESS NOTES
"  Chief Complaint   Patient presents with   • GI Problem     Subjective   Noman Mims is a 47 y.o. male.           Abdominal Pain  This is a recurrent problem. The current episode started more than 1 month ago. The onset quality is gradual. The problem occurs intermittently. The problem has been gradually worsening. The pain is located in the epigastric region and LUQ. The pain is at a severity of 4/10. The pain is moderate. The quality of the pain is sharp and dull. The abdominal pain radiates to the LUQ and epigastric region. Associated symptoms include arthralgias. The treatment provided moderate relief. There is no history of abdominal surgery, colon cancer, Crohn's disease, irritable bowel syndrome, pancreatitis or PUD.        The following portions of the patient's history were reviewed and updated as appropriate: allergies, current medications, past social history and problem list.    Review of Systems   Eyes: Negative.  Negative for photophobia, redness and visual disturbance.   Respiratory: Negative.    Cardiovascular: Negative.  Negative for chest pain, palpitations and leg swelling.   Gastrointestinal: Positive for abdominal pain. Negative for abdominal distention.   Endocrine: Negative.    Genitourinary: Negative.    Musculoskeletal: Positive for arthralgias, back pain and gait problem.   Skin: Negative.    Allergic/Immunologic: Negative.    Hematological: Negative.    Psychiatric/Behavioral: Negative.  Negative for agitation, behavioral problems, confusion and decreased concentration.       Objective   BP 98/70   Ht 175.3 cm (69\")   Wt 101 kg (223 lb)   BMI 32.93 kg/m²   Physical Exam  Vitals and nursing note reviewed.   Constitutional:       General: He is not in acute distress.     Appearance: Normal appearance. He is normal weight. He is not ill-appearing, toxic-appearing or diaphoretic.   HENT:      Head: Normocephalic and atraumatic.      Right Ear: Tympanic membrane normal. There is no " impacted cerumen.      Left Ear: Tympanic membrane normal. There is no impacted cerumen.      Nose: Nose normal. No congestion or rhinorrhea.      Mouth/Throat:      Mouth: Mucous membranes are moist.      Pharynx: No oropharyngeal exudate or posterior oropharyngeal erythema.   Eyes:      General: No visual field deficit or scleral icterus.        Right eye: No discharge.         Left eye: No discharge.      Extraocular Movements: Extraocular movements intact.      Pupils: Pupils are equal, round, and reactive to light.   Neck:      Vascular: No carotid bruit.   Cardiovascular:      Rate and Rhythm: Normal rate and regular rhythm.      Pulses:           Dorsalis pedis pulses are 3+ on the right side and 3+ on the left side.        Posterior tibial pulses are 3+ on the right side and 3+ on the left side.      Heart sounds: Normal heart sounds. No murmur heard.    No friction rub. No gallop.   Pulmonary:      Effort: Pulmonary effort is normal. No respiratory distress.      Breath sounds: No stridor. No wheezing, rhonchi or rales.   Chest:      Chest wall: No tenderness.   Abdominal:      General: Abdomen is flat. There is no distension.      Palpations: Abdomen is soft. There is no mass.      Tenderness: There is abdominal tenderness in the epigastric area and left upper quadrant. There is no right CVA tenderness, left CVA tenderness, guarding or rebound.      Hernia: No hernia is present.       Musculoskeletal:         General: Tenderness present. No swelling, deformity or signs of injury. Normal range of motion.      Cervical back: Normal range of motion and neck supple. No rigidity or tenderness.      Right lower leg: No edema.      Left lower leg: No edema.      Right foot: Normal range of motion. No deformity or bunion.      Left foot: Normal range of motion. No deformity or bunion.   Feet:      Right foot:      Protective Sensation: 6 sites tested. 6 sites sensed.      Skin integrity: Skin integrity normal. No  ulcer, blister or skin breakdown.      Left foot:      Protective Sensation: 6 sites tested. 6 sites sensed.      Skin integrity: Skin integrity normal. No ulcer, blister or skin breakdown.      Toenail Condition: Left toenails are normal.   Lymphadenopathy:      Cervical: No cervical adenopathy.   Skin:     General: Skin is warm.      Capillary Refill: Capillary refill takes less than 2 seconds.      Coloration: Skin is not jaundiced or pale.      Findings: No bruising, erythema, lesion or rash.   Neurological:      General: No focal deficit present.      Mental Status: He is alert and oriented to person, place, and time.      Cranial Nerves: No cranial nerve deficit or dysarthria.      Sensory: No sensory deficit.      Motor: No weakness, tremor, atrophy, abnormal muscle tone, seizure activity or pronator drift.      Coordination: Romberg sign negative. Coordination normal. Finger-Nose-Finger Test and Heel to Shin Test normal. Rapid alternating movements normal.      Gait: Gait and tandem walk normal.      Deep Tendon Reflexes: Reflexes normal.   Psychiatric:         Mood and Affect: Mood normal.         Behavior: Behavior normal.              Assessment & Plan     Problems Addressed this Visit    None  Visit Diagnoses     Epigastric pain    -  Primary    Relevant Orders    Ambulatory Referral to Gastroenterology (Completed)    US Gallbladder    Amylase    Lipase    Comprehensive Metabolic Panel    Gastroesophageal reflux disease without esophagitis        Relevant Medications    dexlansoprazole (Dexilant) 60 MG capsule      Diagnoses       Codes Comments    Epigastric pain    -  Primary ICD-10-CM: R10.13  ICD-9-CM: 789.06     Gastroesophageal reflux disease without esophagitis     ICD-10-CM: K21.9  ICD-9-CM: 530.81            New Medications Ordered This Visit   Medications   • dexlansoprazole (Dexilant) 60 MG capsule     Sig: Take 1 capsule by mouth Daily for 30 days.     Dispense:  90 capsule     Refill:  3      Current Outpatient Medications on File Prior to Visit   Medication Sig Dispense Refill   • Ascorbic Acid (Vitamin C) 500 MG capsule Take  by mouth Daily.     • busPIRone (BUSPAR) 10 MG tablet Take 1 tablet by mouth 3 (Three) Times a Day As Needed (tid prn). 90 tablet 3   • cloNIDine (Catapres) 0.1 MG tablet Use if blood pressure is 180/90 or above bid 60 tablet 5   • Dulaglutide (Trulicity) 0.75 MG/0.5ML solution pen-injector Inject 0.75 mg under the skin into the appropriate area as directed 1 (One) Time Per Week. 4 pen 11   • hydrOXYzine (ATARAX) 25 MG tablet Take 1 tablet by mouth 3 (Three) Times a Day As Needed for Allergies or Anxiety. 90 tablet 1   • loratadine (Claritin) 10 MG tablet Take 1 tablet by mouth Daily. 30 tablet 3   • magnesium, as, gluconate (MAGONATE) 500 (27 Mg) MG tablet Take 1 tablet by mouth 2 (Two) Times a Day.     • metoprolol succinate XL (TOPROL-XL) 25 MG 24 hr tablet TAKE 1 TABLET BY MOUTH once daily 90 tablet 3   • multivitamin with minerals tablet tablet Take 1 tablet by mouth Daily.     • prochlorperazine (COMPAZINE) 10 MG tablet Take 1 tablet by mouth Every 6 (Six) Hours As Needed for Nausea or Vomiting. 40 tablet 6   • promethazine (PHENERGAN) 25 MG tablet Take 1 tablet by mouth Every 6 (Six) Hours As Needed for Nausea or Vomiting. 60 tablet 11   • sertraline (Zoloft) 50 MG tablet Take 1 tablet by mouth Daily. 90 tablet 3   • silver sulfadiazine (Silvadene) 1 % cream Apply 1 application topically to the appropriate area as directed 2 (Two) Times a Day. 400 g 11   • tiZANidine (ZANAFLEX) 4 MG tablet TAKE 1 TABLET BY MOUTH EVERY 8 HOURS AS NEEDED FOR MUSCLE SPASMS 90 tablet 5   • TiZANidine (Zanaflex) 6 MG capsule Take 1 capsule by mouth 3 (Three) Times a Day. 90 capsule 3   • valsartan-hydrochlorothiazide (DIOVAN-HCT) 320-12.5 MG per tablet TAKE 1 TABLET BY MOUTH once daily 30 tablet 5   • Zinc 30 MG capsule Take  by mouth Daily.     • [DISCONTINUED] pantoprazole (PROTONIX) 20 MG  EC tablet TAKE 1 TABLET BY MOUTH once daily AS NEEDED for heartburn 90 tablet 3   • [DISCONTINUED] sertraline (Zoloft) 25 MG tablet Take 1 tablet by mouth Daily. (Patient not taking: Reported on 5/18/2023) 30 tablet 11     No current facility-administered medications on file prior to visit.       20 minutes   Follow Up   No follow-ups on file.     Patient will benefit from an egd -us of gb as directed  Labs as directed, meds as directed     Gastro referral as directed-pt has seen Dr Robledo in the past   ER if worsen  Avoid alcohol as directed -patient agrees-drinking up to 12 beers a day

## 2023-05-19 LAB
ALBUMIN SERPL-MCNC: 4.5 G/DL (ref 3.5–5.2)
ALBUMIN/GLOB SERPL: 1.4 G/DL
ALP SERPL-CCNC: 201 U/L (ref 39–117)
ALT SERPL W P-5'-P-CCNC: 53 U/L (ref 1–41)
AMYLASE SERPL-CCNC: 70 U/L (ref 28–100)
ANION GAP SERPL CALCULATED.3IONS-SCNC: 17.6 MMOL/L (ref 5–15)
AST SERPL-CCNC: 82 U/L (ref 1–40)
BILIRUB SERPL-MCNC: 0.6 MG/DL (ref 0–1.2)
BUN SERPL-MCNC: 24 MG/DL (ref 6–20)
BUN/CREAT SERPL: 14.8 (ref 7–25)
CALCIUM SPEC-SCNC: 9.9 MG/DL (ref 8.6–10.5)
CHLORIDE SERPL-SCNC: 76 MMOL/L (ref 98–107)
CO2 SERPL-SCNC: 27.4 MMOL/L (ref 22–29)
CREAT SERPL-MCNC: 1.62 MG/DL (ref 0.76–1.27)
EGFRCR SERPLBLD CKD-EPI 2021: 52 ML/MIN/1.73
GLOBULIN UR ELPH-MCNC: 3.3 GM/DL
GLUCOSE SERPL-MCNC: 243 MG/DL (ref 65–99)
LIPASE SERPL-CCNC: 102 U/L (ref 13–60)
POTASSIUM SERPL-SCNC: 3.7 MMOL/L (ref 3.5–5.2)
PROT SERPL-MCNC: 7.8 G/DL (ref 6–8.5)
SODIUM SERPL-SCNC: 121 MMOL/L (ref 136–145)

## 2023-05-24 ENCOUNTER — TELEPHONE (OUTPATIENT)
Dept: FAMILY MEDICINE CLINIC | Facility: CLINIC | Age: 48
End: 2023-05-24
Payer: MEDICAID

## 2023-05-24 ENCOUNTER — HOSPITAL ENCOUNTER (OUTPATIENT)
Dept: ULTRASOUND IMAGING | Facility: HOSPITAL | Age: 48
Discharge: HOME | End: 2023-05-24
Admitting: NURSE PRACTITIONER
Payer: MEDICAID

## 2023-05-24 PROCEDURE — 76705 ECHO EXAM OF ABDOMEN: CPT

## 2023-05-24 NOTE — TELEPHONE ENCOUNTER
Per LISBETH Leroy, MsrSharron Mims has been called with recent Gallbladder US results & recommendations.  Continue current medications and follow-up as planned or sooner if any problems.     He has an appointment to see Dr Robledo June 7th

## 2023-05-24 NOTE — TELEPHONE ENCOUNTER
----- Message from LISBETH De La Rosa sent at 5/24/2023 11:59 AM CDT -----  Can you let him know he has a fatty liver but gb is good-make sure he follows with gastro as directed, -he sees Dr Robledo

## 2023-06-01 ENCOUNTER — PREP FOR SURGERY (OUTPATIENT)
Dept: OTHER | Facility: HOSPITAL | Age: 48
End: 2023-06-01

## 2023-06-01 DIAGNOSIS — R11.2 NAUSEA WITH VOMITING: Primary | ICD-10-CM

## 2023-06-01 RX ORDER — SODIUM CHLORIDE 9 MG/ML
40 INJECTION, SOLUTION INTRAVENOUS AS NEEDED
OUTPATIENT
Start: 2023-06-01

## 2023-06-01 RX ORDER — DEXTROSE AND SODIUM CHLORIDE 5; .45 G/100ML; G/100ML
30 INJECTION, SOLUTION INTRAVENOUS CONTINUOUS PRN
OUTPATIENT
Start: 2023-06-01

## 2023-06-05 RX ORDER — PROMETHAZINE HYDROCHLORIDE 25 MG/1
TABLET ORAL
Qty: 60 TABLET | Refills: 11 | Status: SHIPPED | OUTPATIENT
Start: 2023-06-05

## 2023-06-08 ENCOUNTER — ANESTHESIA EVENT (OUTPATIENT)
Dept: GASTROENTEROLOGY | Facility: HOSPITAL | Age: 48
End: 2023-06-08
Payer: MEDICAID

## 2023-06-08 ENCOUNTER — ANESTHESIA (OUTPATIENT)
Dept: GASTROENTEROLOGY | Facility: HOSPITAL | Age: 48
End: 2023-06-08
Payer: MEDICAID

## 2023-06-08 ENCOUNTER — HOSPITAL ENCOUNTER (OUTPATIENT)
Facility: HOSPITAL | Age: 48
Setting detail: HOSPITAL OUTPATIENT SURGERY
Discharge: HOME | End: 2023-06-08
Attending: INTERNAL MEDICINE | Admitting: INTERNAL MEDICINE
Payer: MEDICAID

## 2023-06-08 VITALS
HEIGHT: 70 IN | BODY MASS INDEX: 30.78 KG/M2 | DIASTOLIC BLOOD PRESSURE: 78 MMHG | SYSTOLIC BLOOD PRESSURE: 116 MMHG | HEART RATE: 100 BPM | WEIGHT: 215 LBS | OXYGEN SATURATION: 96 % | TEMPERATURE: 97 F | RESPIRATION RATE: 18 BRPM

## 2023-06-08 DIAGNOSIS — R11.2 NAUSEA WITH VOMITING: ICD-10-CM

## 2023-06-08 LAB — GLUCOSE BLDC GLUCOMTR-MCNC: 246 MG/DL (ref 70–130)

## 2023-06-08 PROCEDURE — 25010000002 PROPOFOL 10 MG/ML EMULSION: Performed by: NURSE ANESTHETIST, CERTIFIED REGISTERED

## 2023-06-08 PROCEDURE — 82948 REAGENT STRIP/BLOOD GLUCOSE: CPT

## 2023-06-08 PROCEDURE — 87071 CULTURE AEROBIC QUANT OTHER: CPT | Performed by: INTERNAL MEDICINE

## 2023-06-08 RX ORDER — SODIUM CHLORIDE 9 MG/ML
40 INJECTION, SOLUTION INTRAVENOUS AS NEEDED
Status: DISCONTINUED | OUTPATIENT
Start: 2023-06-08 | End: 2023-06-08 | Stop reason: HOSPADM

## 2023-06-08 RX ORDER — PROPOFOL 10 MG/ML
VIAL (ML) INTRAVENOUS AS NEEDED
Status: DISCONTINUED | OUTPATIENT
Start: 2023-06-08 | End: 2023-06-08 | Stop reason: SURG

## 2023-06-08 RX ORDER — LIDOCAINE HYDROCHLORIDE 20 MG/ML
INJECTION, SOLUTION INTRAVENOUS AS NEEDED
Status: DISCONTINUED | OUTPATIENT
Start: 2023-06-08 | End: 2023-06-08 | Stop reason: SURG

## 2023-06-08 RX ORDER — DEXTROSE AND SODIUM CHLORIDE 5; .45 G/100ML; G/100ML
30 INJECTION, SOLUTION INTRAVENOUS CONTINUOUS PRN
Status: DISCONTINUED | OUTPATIENT
Start: 2023-06-08 | End: 2023-06-08 | Stop reason: HOSPADM

## 2023-06-08 RX ORDER — SODIUM CHLORIDE 9 MG/ML
50 INJECTION, SOLUTION INTRAVENOUS CONTINUOUS
Status: DISCONTINUED | OUTPATIENT
Start: 2023-06-08 | End: 2023-06-08 | Stop reason: HOSPADM

## 2023-06-08 RX ADMIN — PROPOFOL 100 MG: 10 INJECTION, EMULSION INTRAVENOUS at 11:03

## 2023-06-08 RX ADMIN — LIDOCAINE HYDROCHLORIDE 60 MG: 20 INJECTION, SOLUTION INTRAVENOUS at 11:00

## 2023-06-08 RX ADMIN — PROPOFOL 60 MG: 10 INJECTION, EMULSION INTRAVENOUS at 11:06

## 2023-06-08 RX ADMIN — SODIUM CHLORIDE 50 ML/HR: 9 INJECTION, SOLUTION INTRAVENOUS at 10:33

## 2023-06-08 RX ADMIN — PROPOFOL 100 MG: 10 INJECTION, EMULSION INTRAVENOUS at 11:00

## 2023-06-08 NOTE — H&P
Royer Madsen DO,[facility]  Gastroenterology  Hepatology  Endoscopy  Board Certified in Internal Medicine and gastroenterology  [address], [address]  [place]. [address]  - [phone]   F - [phone]     GASTROENTEROLOGY HISTORY AND PHYSICAL  NOTE   ROYER MADSEN DO.         SUBJECTIVE:   6/8/2023    Name: Noman Mims  DOD: 1975        Chief Complaint:         Subjective: Dyspepsia    Patient is 47 y.o. male presents with desire for elective EGD with biopsy and culture as required.      ROS/HISTORY/ CURRENT MEDICATIONS/OBJECTIVE/VS/PE:   Review of Systems:  All systems unremarkable unless specified below.  Constitutional   HENT  Eyes   Respiratory    Cardiovascular  Gastrointestinal   Endocrine  Genitourinary    Musculoskeletal   Skin  Allergic/Immunologic    Neurological    Hematological  Psychiatric/Behavioral    History:     Past Medical History:   Diagnosis Date    Alpha galactosidase deficiency     Callus     Diabetes mellitus     Hypertension      Past Surgical History:   Procedure Laterality Date    COLONOSCOPY N/A 3/18/2022    Procedure: COLONOSCOPY 1:30;  Surgeon: Royer Madsen DO;  Location: [facility] ENDOSCOPY;  Service: Gastroenterology;  Laterality: N/A;    JOINT REPLACEMENT Right     hip    KNEE MENISCECTOMY      TOTAL HIP ARTHROPLASTY      TOTAL SHOULDER ARTHROPLASTY Right      Family History   Problem Relation Age of Onset    Heart disease Father     Diabetes Father     Cancer Maternal Grandmother      Social History     Tobacco Use    Smoking status: Some Days     Types: Cigarettes    Smokeless tobacco: Current     Types: Chew   Vaping Use    Vaping Use: Never used   Substance Use Topics    Alcohol use: Yes     Alcohol/week: 3.0 standard drinks     Types: 3 Cans of beer per week     Comment: drinks a beer every couple days    Drug use: Defer     Prior to Admission medications    Medication Sig Start Date End Date Taking? Authorizing Provider   busPIRone (BUSPAR)  10 MG tablet Take 1 tablet by mouth 3 (Three) Times a Day As Needed (tid prn). 4/12/23  Yes Mariaa Rebolledo APRN   cloNIDine (Catapres) 0.1 MG tablet Use if blood pressure is 180/90 or above bid 4/29/20  Yes Mariaa Rebolledo APRN   dexlansoprazole (Dexilant) 60 MG capsule Take 1 capsule by mouth Daily for 30 days. 5/18/23 6/17/23 Yes Mariaa Rebolledo APRN   hydrOXYzine (ATARAX) 25 MG tablet Take 1 tablet by mouth 3 (Three) Times a Day As Needed for Allergies or Anxiety. 1/19/23  Yes Suzanne Wei APRN   loratadine (Claritin) 10 MG tablet Take 1 tablet by mouth Daily. 1/19/23  Yes Suzanne Wei APRN   tiZANidine (ZANAFLEX) 4 MG tablet TAKE 1 TABLET BY MOUTH EVERY 8 HOURS AS NEEDED FOR MUSCLE SPASMS 2/20/23  Yes Mariaa Rebolledo APRN   TiZANidine (Zanaflex) 6 MG capsule Take 1 capsule by mouth 3 (Three) Times a Day. 4/12/23  Yes Mariaa Rebolledo APRN   valsartan-hydrochlorothiazide (DIOVAN-HCT) 320-12.5 MG per tablet TAKE 1 TABLET BY MOUTH once daily 4/24/23  Yes Mariaa Rebolledo APRN   magnesium, as, gluconate (MAGONATE) 500 (27 Mg) MG tablet Take 1 tablet by mouth 2 (Two) Times a Day.    Jose Hinson MD   metoprolol succinate XL (TOPROL-XL) 25 MG 24 hr tablet TAKE 1 TABLET BY MOUTH once daily 1/16/23   Mariaa Rebolledo APRN   multivitamin with minerals tablet tablet Take 1 tablet by mouth Daily.    Jose Hinson MD   promethazine (PHENERGAN) 25 MG tablet TAKE 1 TABLET BY MOUTH EVERY 6 HOURS AS NEEDED FOR NAUSEA / FOR VOMITING 6/5/23   Mariaa Rebolledo APRN   Zinc 30 MG capsule Take  by mouth Daily.    Jose Hinson MD     Allergies:  Alpha-gal, Beef allergy, and Pork allergy    I have reviewed the patients medical history, surgical history and family history in the available medical record system.     Current Medications:     Current Facility-Administered Medications   Medication Dose Route Frequency Provider Last Rate Last Admin    dextrose  5 % and sodium chloride 0.45 % infusion  30 mL/hr Intravenous Continuous PRN Steve Robledo R, DO        sodium chloride 0.9 % infusion 40 mL  40 mL Intravenous PRN Steve Robledo, DO        sodium chloride 0.9 % infusion  50 mL/hr Intravenous Continuous Steve Robledo, DO 50 mL/hr at 06/08/23 1033 50 mL/hr at 06/08/23 1033       Objective     Physical Exam:   Temp:  [97.2 °F (36.2 °C)] 97.2 °F (36.2 °C)  Heart Rate:  [98] 98  Resp:  [18] 18  BP: (176)/(68) 176/68    Physical Exam:  General Appearance:    Alert, cooperative, in no acute distress   Head:    Normocephalic, without obvious abnormality, atraumatic   Eyes:            Lids and lashes normal, conjunctivae and sclerae normal, no icterus, no pallor, corneas clear, PERRLA   Ears:    Ears appear intact with no abnormalities noted   Throat:   No oral lesions, no thrush, oral mucosa moist   Neck:   No adenopathy, supple, trachea midline, no thyromegaly, no  carotid bruit, no JVD   Back:     No kyphosis present, no scoliosis present, no skin lesions,   erythema or scars, no tenderness to percussion or                 palpation,  range of motion normal   Lungs:     Clear to auscultation,respirations regular, even and         unlabored    Heart:    Regular rhythm and normal rate, normal S1 and S2, no  murmur, no gallop, no rub, no click   Breast Exam:    Deferred   Abdomen:     Normal bowel sounds, no masses, no organomegaly, soft  nontender, nondistended, no guarding, no rebound                 tenderness   Genitalia:    Deferred   Extremities:   Moves all extremities well, no edema, no cyanosis, no          redness   Pulses:   Pulses palpable and equal bilaterally   Skin:   No bleeding, bruising or rash   Lymph nodes:   No palpable adenopathy   Neurologic:   Cranial nerves 2 - 12 grossly intact, sensation intact, DTR     present and equal bilaterally      Results Review:     Lab Results   Component Value Date    WBC 5.71 08/25/2022    WBC 7.28 11/08/2021     WBC 5.68 09/13/2021    HGB 12.5 (L) 08/25/2022    HGB 13.7 11/08/2021    HGB 13.1 09/13/2021    HCT 36.3 (L) 08/25/2022    HCT 39.1 11/08/2021    HCT 37.0 (L) 09/13/2021     08/25/2022     11/08/2021     09/13/2021             No results found for: LIPASE  Lab Results   Component Value Date    INR 1.05 07/26/2018     No results found for: TISSUECX    Radiology Review:  Imaging Results (Last 72 Hours)       ** No results found for the last 72 hours. **             I reviewed the patient's new clinical results.  I reviewed the patient's new imaging results and agree with the interpretation.     ASSESSMENT/PLAN:   ASSESSMENT:  1.  Dyspepsia.  Suspect alcohol-related    PLAN:  1.  Esophagogastroduodenoscopy with biopsy and culture    Risk and benefits associated with the procedure are reviewed with the patient.  The patient wished to proceed     Steve Robledo DO  06/08/23  10:43 CDT

## 2023-06-08 NOTE — ANESTHESIA POSTPROCEDURE EVALUATION
Patient: Noman Mims    Procedure Summary       Date: 06/08/23 Room / Location: [facility] ENDOSCOPY 2 / [facility] ENDOSCOPY    Anesthesia Start: 1046 Anesthesia Stop: 1109    Procedure: ESOPHAGOGASTRODUODENOSCOPY 11:00 Diagnosis:       Nausea with vomiting      (Nausea with vomiting [R11.2])    Surgeons: Steve Robledo DO Provider: Alfie Peterson CRNA    Anesthesia Type: general ASA Status: 3            Anesthesia Type: general    Vitals  No vitals data found for the desired time range.          Post Anesthesia Care and Evaluation    Patient location during evaluation: PHASE II  Patient participation: waiting for patient participation  Level of consciousness: sleepy but conscious  Pain management: adequate    Airway patency: patent  Anesthetic complications: No anesthetic complications  PONV Status: none  Cardiovascular status: acceptable  Respiratory status: acceptable, spontaneous ventilation and room air  Hydration status: acceptable    Comments: See flow sheet    
via EMR

## 2023-06-08 NOTE — DISCHARGE INSTR - APPOINTMENTS
Dr. Steve Robledo, DO  [address]  [place] [address]  [phone]    Appointment date and time:    September 15, 2023  @  8:15 am

## 2023-06-09 LAB — REF LAB TEST METHOD: NORMAL

## 2023-06-10 LAB — BACTERIA SPEC AEROBE CULT: NORMAL

## 2023-07-25 ENCOUNTER — PATIENT OUTREACH (OUTPATIENT)
Dept: CASE MANAGEMENT | Facility: OTHER | Age: 48
End: 2023-07-25
Payer: MEDICAID

## 2023-07-25 NOTE — OUTREACH NOTE
AMBULATORY CASE MANAGEMENT NOTE    Name and Relationship of Patient/Support Person: Noman Mims - Self    Patient Outreach    Outreach with pt identified for HRCM based on elevated A1C. Pt states he has started back on trulicity and he has stopped drinking so he should be doing better with diabtes.     Ailyn GEORGE  Ambulatory Case Management    7/25/2023, 10:37 EDT

## 2023-08-04 RX ORDER — BUSPIRONE HYDROCHLORIDE 10 MG/1
TABLET ORAL
Qty: 90 TABLET | Refills: 3 | Status: SHIPPED | OUTPATIENT
Start: 2023-08-04

## 2023-08-25 RX ORDER — TIZANIDINE HYDROCHLORIDE 6 MG/1
CAPSULE, GELATIN COATED ORAL
Qty: 90 CAPSULE | Refills: 3 | Status: SHIPPED | OUTPATIENT
Start: 2023-08-25

## 2023-08-25 RX ORDER — TIZANIDINE 4 MG/1
TABLET ORAL
Qty: 90 TABLET | Refills: 5 | Status: SHIPPED | OUTPATIENT
Start: 2023-08-25

## 2023-08-25 NOTE — TELEPHONE ENCOUNTER
LAST OFFICE VISIT - THIS DEPT  6/23/2023 Mariaa Rebolledo, LISBETH    Med List indicates Trulicity ws DC 06/05/2023, looks like the patient got refills early August, so it looks like he is still taking it.     Please  Advise    The original prescription was discontinued on 6/5/2023 by Bisi Dueñas, RN for the following reason: *Therapy completed. Renewing this prescription may not be appropriate.

## 2023-08-26 RX ORDER — DULAGLUTIDE 0.75 MG/.5ML
INJECTION, SOLUTION SUBCUTANEOUS
Qty: 4 ML | Refills: 11 | Status: SHIPPED | OUTPATIENT
Start: 2023-08-26 | End: 2023-11-24

## 2023-09-26 DIAGNOSIS — R00.2 PALPITATIONS: ICD-10-CM

## 2023-09-26 DIAGNOSIS — R07.9 CHEST PAIN, UNSPECIFIED TYPE: Primary | ICD-10-CM

## 2023-09-26 DIAGNOSIS — Z82.49 FAMILY HISTORY OF ISCHEMIC HEART DISEASE: ICD-10-CM

## 2023-09-26 DIAGNOSIS — I25.10 ATHEROSCLEROSIS OF NATIVE CORONARY ARTERY OF NATIVE HEART, UNSPECIFIED WHETHER ANGINA PRESENT: ICD-10-CM

## (undated) DEVICE — BITEBLOCK ENDO W/STRAP 60F A/ LF DISP

## (undated) DEVICE — Device: Brand: DISPOSABLE ELECTROSURGICAL SNARE

## (undated) DEVICE — CANN SMPL SOFTECH BIFLO ETCO2 A/M 7FT

## (undated) DEVICE — TRAP,MUCUS SPECIMEN,40CC: Brand: MEDLINE

## (undated) DEVICE — SINGLE-USE BIOPSY FORCEPS: Brand: RADIAL JAW 4